# Patient Record
Sex: FEMALE | Race: WHITE | NOT HISPANIC OR LATINO | Employment: FULL TIME | URBAN - METROPOLITAN AREA
[De-identification: names, ages, dates, MRNs, and addresses within clinical notes are randomized per-mention and may not be internally consistent; named-entity substitution may affect disease eponyms.]

---

## 2018-06-19 ENCOUNTER — OFFICE VISIT (OUTPATIENT)
Dept: URGENT CARE | Facility: CLINIC | Age: 23
End: 2018-06-19
Payer: COMMERCIAL

## 2018-06-19 VITALS
SYSTOLIC BLOOD PRESSURE: 118 MMHG | OXYGEN SATURATION: 99 % | HEIGHT: 63 IN | HEART RATE: 108 BPM | WEIGHT: 116 LBS | DIASTOLIC BLOOD PRESSURE: 88 MMHG | RESPIRATION RATE: 14 BRPM | TEMPERATURE: 97.9 F | BODY MASS INDEX: 20.55 KG/M2

## 2018-06-19 DIAGNOSIS — H66.002 ACUTE SUPPURATIVE OTITIS MEDIA OF LEFT EAR WITHOUT SPONTANEOUS RUPTURE OF TYMPANIC MEMBRANE, RECURRENCE NOT SPECIFIED: Primary | ICD-10-CM

## 2018-06-19 DIAGNOSIS — J02.9 EXUDATIVE PHARYNGITIS: ICD-10-CM

## 2018-06-19 LAB
INT CON NEG: NORMAL
INT CON POS: NORMAL
S PYO AG THROAT QL: NEGATIVE

## 2018-06-19 PROCEDURE — 87880 STREP A ASSAY W/OPTIC: CPT | Performed by: PHYSICIAN ASSISTANT

## 2018-06-19 PROCEDURE — 99204 OFFICE O/P NEW MOD 45 MIN: CPT | Performed by: PHYSICIAN ASSISTANT

## 2018-06-19 RX ORDER — AMOXICILLIN 875 MG/1
875 TABLET, COATED ORAL 2 TIMES DAILY
Qty: 20 TAB | Refills: 0 | Status: SHIPPED | OUTPATIENT
Start: 2018-06-19 | End: 2018-06-29

## 2018-06-19 ASSESSMENT — PATIENT HEALTH QUESTIONNAIRE - PHQ9: CLINICAL INTERPRETATION OF PHQ2 SCORE: 0

## 2018-06-19 NOTE — PROGRESS NOTES
Subjective:   Pt is a 22 y.o. female who presents with Cough (loss of voice) and Pharyngitis            HPI  PT presents to  clinic today complaining of sore throat, pressure in ears, loss of voice, fatigue, runny nose. PT denies CP, SOB, NVD, abdominal pain, joint pain. PT states these symptoms began around 3 days ago. PT states the pain is a 7/10 with swallowing, aching in nature and worse at night.  Pt has not taken any RX medications for this condition. The pt's medication list, problem list, and allergies have been evaluated and reviewed during today's visit.    PMH:  Negative per pt.      PSH:  Negative per pt.      Fam Hx:  the patient's family history is not pertinent to their current complaint    Soc HX:  Social History     Social History   • Marital status: Single     Spouse name: N/A   • Number of children: N/A   • Years of education: N/A     Occupational History   • Not on file.     Social History Main Topics   • Smoking status: Never Smoker   • Smokeless tobacco: Never Used   • Alcohol use No   • Drug use: No   • Sexual activity: Not on file     Other Topics Concern   • Not on file     Social History Narrative   • No narrative on file         Medications:    Current Outpatient Prescriptions:   •  amoxicillin (AMOXIL) 875 MG tablet, Take 1 Tab by mouth 2 times a day for 10 days., Disp: 20 Tab, Rfl: 0  •  Alum & Mag Hydroxide-Simeth (MAALOX PLUS-BENADRYL-XYLOCAINE), Take 15 mL by mouth every 6 hours as needed for up to 5 days., Disp: 300 mL, Rfl: 0  •  omeprazole (PRILOSEC) 40 MG delayed-release capsule, Take 1 Cap by mouth every day., Disp: 30 Cap, Rfl: 0      Allergies:  Hydrocodone    ROS   Constitutional: Positive for malaise/fatigue.   HENT: Positive for congestion and sore throat. POS for LEFT ear pain.    Eyes: Negative for blurred vision, double vision and photophobia.   Respiratory: Negative for cough, shortness of breath and wheezing.    Cardiovascular: Negative for chest pain and  "palpitations.   Gastrointestinal: Negative for nausea, vomiting, abdominal pain, diarrhea and constipation.   Genitourinary: Negative for dysuria and flank pain.   Musculoskeletal: Negative for falls and myalgias.   Skin: Negative for itching and rash.   Neurological:  Negative for dizziness and tingling.   Endo/Heme/Allergies: Does not bruise/bleed easily.   Psychiatric/Behavioral: Negative for depression. The patient is not nervous/anxious.               Objective:     /88   Pulse (!) 108   Temp 36.6 °C (97.9 °F)   Resp 14   Ht 1.6 m (5' 3\")   Wt 52.6 kg (116 lb)   SpO2 99%   BMI 20.55 kg/m²      Physical Exam      Constitutional: PT is oriented to person, place, and time. PT appears well-developed and well-nourished. No distress.   HENT:   Head: Normocephalic and atraumatic.   Right Ear: Hearing, tympanic membrane, external ear and ear canal normal.   Left Ear: Hearing, external ear and ear canal normal. Tympanic membrane is erythematous and bulging. A middle ear effusion is present.   Nose: Mucosal edema, rhinorrhea and sinus tenderness present. Right sinus exhibits frontal sinus tenderness. Left sinus exhibits frontal sinus tenderness.   Mouth/Throat: Uvula is midline. Mucous membranes are pale. Posterior oropharyngeal edema and posterior oropharyngeal erythema with exudate on the left side  Eyes: Conjunctivae normal and EOM are normal. Pupils are equal, round, and reactive to light.   Neck: Normal range of motion. Neck supple. No thyromegaly present.   Cardiovascular: Normal rate, regular rhythm, normal heart sounds and intact distal pulses.  Exam reveals no gallop and no friction rub.    No murmur heard.  Pulmonary/Chest: Effort normal and breath sounds normal. No respiratory distress. PT has no wheezes. PT has no rales. PT exhibits no tenderness.   Abdominal: Soft. Bowel sounds are normal. PT exhibits no distension and no mass. There is no tenderness. There is no rebound and no guarding. "   Musculoskeletal: Normal range of motion. PT exhibits no edema and no tenderness.   Lymphadenopathy:     PT has no cervical adenopathy.   Neurological: PT is alert and oriented to person, place, and time. PT displays normal reflexes. No cranial nerve deficit. PT exhibits normal muscle tone. Coordination normal.   Skin: Skin is warm and dry. No rash noted. No erythema.   Psychiatric: PT has a normal mood and affect. PT behavior is normal. Judgment and thought content normal.          Assessment/Plan:     1. Acute suppurative otitis media of left ear without spontaneous rupture of tympanic membrane, recurrence not specified    - amoxicillin (AMOXIL) 875 MG tablet; Take 1 Tab by mouth 2 times a day for 10 days.  Dispense: 20 Tab; Refill: 0    2. Exudative pharyngitis  Back-up abx if symptoms do not improve in 2-3 days. PT on clinical presentation has exudate on oropharynx and it's possible beyond the strep A testing that this could be a different type of strep (C/G) or A. haemolyticum     - amoxicillin (AMOXIL) 875 MG tablet; Take 1 Tab by mouth 2 times a day for 10 days.  Dispense: 20 Tab; Refill: 0  - POCT Rapid Strep A-->NEG  - Alum & Mag Hydroxide-Simeth (MAALOX PLUS-BENADRYL-XYLOCAINE); Take 15 mL by mouth every 6 hours as needed for up to 5 days.  Dispense: 300 mL; Refill: 0    Rest, fluids encouraged.  OTC decongestant for congestion  Note given for work.  AVS with medical info given.  Pt was in full understanding and agreement with the plan.  Follow-up as needed if symptoms worsen or fail to improve.

## 2018-06-19 NOTE — LETTER
June 19, 2018       Patient: Hieu Stroud   YOB: 1995   Date of Visit: 6/19/2018         To Whom It May Concern:    It is my medical opinion that Hieu Stroud may be excused from work for the dates of 6/19/18-6/22/18.      If you have any questions or concerns, please don't hesitate to call [phone]          Sincerely,          Wilfredo Vegas P.A.-C.  Electronically Signed

## 2019-06-14 ENCOUNTER — OFFICE VISIT (OUTPATIENT)
Dept: URGENT CARE | Facility: CLINIC | Age: 24
End: 2019-06-14
Payer: COMMERCIAL

## 2019-06-14 VITALS
TEMPERATURE: 99.8 F | WEIGHT: 117 LBS | OXYGEN SATURATION: 99 % | DIASTOLIC BLOOD PRESSURE: 68 MMHG | BODY MASS INDEX: 20.73 KG/M2 | SYSTOLIC BLOOD PRESSURE: 114 MMHG | RESPIRATION RATE: 12 BRPM | HEART RATE: 85 BPM | HEIGHT: 63 IN

## 2019-06-14 DIAGNOSIS — J01.10 ACUTE NON-RECURRENT FRONTAL SINUSITIS: ICD-10-CM

## 2019-06-14 PROCEDURE — 99214 OFFICE O/P EST MOD 30 MIN: CPT | Performed by: NURSE PRACTITIONER

## 2019-06-14 RX ORDER — AMOXICILLIN AND CLAVULANATE POTASSIUM 875; 125 MG/1; MG/1
1 TABLET, FILM COATED ORAL 2 TIMES DAILY
Qty: 14 TAB | Refills: 0 | Status: SHIPPED | OUTPATIENT
Start: 2019-06-14 | End: 2019-06-21

## 2019-06-14 ASSESSMENT — ENCOUNTER SYMPTOMS
MUSCULOSKELETAL NEGATIVE: 1
SORE THROAT: 1
FEVER: 0
HEADACHES: 1
SPUTUM PRODUCTION: 0
COUGH: 1
SINUS PAIN: 1
BLURRED VISION: 0
DOUBLE VISION: 0
PALPITATIONS: 0
STRIDOR: 0
CONSTIPATION: 0
SINUS PRESSURE: 1
WHEEZING: 0
VOMITING: 0
CHILLS: 0
NAUSEA: 0
DIARRHEA: 0
ABDOMINAL PAIN: 0
DIZZINESS: 0
SHORTNESS OF BREATH: 0

## 2019-06-14 ASSESSMENT — PAIN SCALES - GENERAL: PAINLEVEL: 5=MODERATE PAIN

## 2019-06-14 NOTE — LETTER
June 14, 2019         Patient: Hieu Stroud   YOB: 1995   Date of Visit: 6/14/2019           To Whom it May Concern:    Hieu Stroud was seen in my clinic on 6/14/2019. Please excuse her from work 6/13/2019 through 6/14/2019. She may return on 6/15/2019.  If you have any questions or concerns, please don't hesitate to call.        Sincerely,           CRISTINA Ace.  Electronically Signed

## 2019-06-14 NOTE — PROGRESS NOTES
Subjective:   Hieu Stroud is a 23 y.o. female who presents for Congestion (x5 days); Sore Throat; Cough; and Sinus Problem        Sinusitis   This is a new problem. The current episode started in the past 7 days. The problem has been gradually worsening since onset. There has been no fever. The pain is moderate. Associated symptoms include congestion, coughing, headaches, sinus pressure and a sore throat. Pertinent negatives include no chills, ear pain or shortness of breath. Past treatments include oral decongestants. The treatment provided mild relief.        Review of Systems   Constitutional: Negative for chills and fever.   HENT: Positive for congestion, sinus pain, sinus pressure and sore throat. Negative for ear discharge and ear pain.    Eyes: Negative for blurred vision and double vision.   Respiratory: Positive for cough. Negative for sputum production, shortness of breath, wheezing and stridor.    Cardiovascular: Negative for chest pain and palpitations.   Gastrointestinal: Negative for abdominal pain, constipation, diarrhea, nausea and vomiting.   Musculoskeletal: Negative.    Skin: Negative.  Negative for itching and rash.   Neurological: Positive for headaches. Negative for dizziness.   All other systems reviewed and are negative.       PMH:  has no past medical history on file.  MEDS:   Current Outpatient Prescriptions:   •  norethindrone-ethinyl estradiol (CYCLAFEM 1/35) 1-35 MG-MCG per tablet, Take 1 Tab by mouth every day., Disp: , Rfl:   •  amoxicillin-clavulanate (AUGMENTIN) 875-125 MG Tab, Take 1 Tab by mouth 2 times a day for 7 days., Disp: 14 Tab, Rfl: 0  ALLERGIES:   Allergies   Allergen Reactions   • Hydrocodone Hives     SURGHX: History reviewed. No pertinent surgical history.  SOCHX:  reports that she has never smoked. She has never used smokeless tobacco. She reports that she does not drink alcohol or use drugs.  FH: Family history was reviewed, no pertinent findings to report      "Objective:   /68   Pulse 85   Temp 37.7 °C (99.8 °F) (Temporal)   Resp 12   Ht 1.6 m (5' 3\")   Wt 53.1 kg (117 lb)   LMP 05/24/2019 (Exact Date)   SpO2 99%   Breastfeeding? No   BMI 20.73 kg/m²   Physical Exam   Constitutional: She is oriented to person, place, and time. She appears well-developed and well-nourished. No distress.   HENT:   Head: Normocephalic.   Right Ear: Hearing and ear canal normal. Tympanic membrane is bulging. Tympanic membrane is not perforated and not erythematous. A middle ear effusion is present.   Left Ear: Hearing and ear canal normal. Tympanic membrane is injected and bulging. Tympanic membrane is not perforated and not erythematous. Tympanic membrane mobility is abnormal.  No middle ear effusion.   Nose: Mucosal edema present. No rhinorrhea. Right sinus exhibits frontal sinus tenderness. Right sinus exhibits no maxillary sinus tenderness. Left sinus exhibits frontal sinus tenderness. Left sinus exhibits no maxillary sinus tenderness.   Mouth/Throat: Uvula is midline, oropharynx is clear and moist and mucous membranes are normal. No posterior oropharyngeal edema or posterior oropharyngeal erythema.   Post nasal drip   Eyes: Pupils are equal, round, and reactive to light. Conjunctivae, EOM and lids are normal.   Neck: Normal range of motion. No thyromegaly present.   Cardiovascular: Normal rate, regular rhythm and normal heart sounds.    Pulmonary/Chest: Effort normal and breath sounds normal. No accessory muscle usage. No apnea, no tachypnea and no bradypnea. No respiratory distress. She has no decreased breath sounds. She has no wheezes.   Lymphadenopathy:        Head (right side): Submandibular adenopathy present. No tonsillar adenopathy present.        Head (left side): No submandibular and no tonsillar adenopathy present.   Neurological: She is alert and oriented to person, place, and time.   Skin: Skin is warm and dry. No rash noted. She is not diaphoretic. "   Psychiatric: She has a normal mood and affect. Her speech is normal and behavior is normal. Judgment and thought content normal.   Vitals reviewed.        Assessment/Plan:   Assessment    1. Acute non-recurrent frontal sinusitis  - amoxicillin-clavulanate (AUGMENTIN) 875-125 MG Tab; Take 1 Tab by mouth 2 times a day for 7 days.  Dispense: 14 Tab; Refill: 0    Other orders  - norethindrone-ethinyl estradiol (CYCLAFEM 1/35) 1-35 MG-MCG per tablet; Take 1 Tab by mouth every day.    Use over-the-counter Flonase daily, one spray each nostril in the morning.  Recommended OTC Ibuprofen for sore throat relief - take as directed on package    Differential diagnosis, natural history, supportive care, and indications for immediate follow-up discussed.

## 2019-09-12 ENCOUNTER — OFFICE VISIT (OUTPATIENT)
Dept: URGENT CARE | Facility: CLINIC | Age: 24
End: 2019-09-12
Payer: COMMERCIAL

## 2019-09-12 VITALS
HEIGHT: 63 IN | DIASTOLIC BLOOD PRESSURE: 74 MMHG | SYSTOLIC BLOOD PRESSURE: 110 MMHG | HEART RATE: 86 BPM | WEIGHT: 120 LBS | OXYGEN SATURATION: 97 % | TEMPERATURE: 98.6 F | BODY MASS INDEX: 21.26 KG/M2 | RESPIRATION RATE: 16 BRPM

## 2019-09-12 DIAGNOSIS — J03.90 TONSILLITIS: ICD-10-CM

## 2019-09-12 DIAGNOSIS — J02.9 SORE THROAT: ICD-10-CM

## 2019-09-12 LAB
INT CON NEG: NORMAL
INT CON POS: NORMAL
S PYO AG THROAT QL: NORMAL

## 2019-09-12 PROCEDURE — 87880 STREP A ASSAY W/OPTIC: CPT | Performed by: NURSE PRACTITIONER

## 2019-09-12 PROCEDURE — 99214 OFFICE O/P EST MOD 30 MIN: CPT | Performed by: NURSE PRACTITIONER

## 2019-09-12 RX ORDER — AMOXICILLIN 875 MG/1
875 TABLET, COATED ORAL 2 TIMES DAILY
Qty: 14 TAB | Refills: 0 | Status: SHIPPED | OUTPATIENT
Start: 2019-09-12 | End: 2019-09-13

## 2019-09-12 ASSESSMENT — ENCOUNTER SYMPTOMS
STRIDOR: 0
MUSCULOSKELETAL NEGATIVE: 1
BLURRED VISION: 0
SPUTUM PRODUCTION: 0
PALPITATIONS: 0
TROUBLE SWALLOWING: 1
ABDOMINAL PAIN: 0
SORE THROAT: 1
HEADACHES: 0
DIZZINESS: 0
FEVER: 0
CHILLS: 0
COUGH: 1
DIARRHEA: 0
CONSTIPATION: 0
NAUSEA: 0
VOMITING: 0
SWOLLEN GLANDS: 1
WHEEZING: 0
DOUBLE VISION: 0

## 2019-09-12 ASSESSMENT — PATIENT HEALTH QUESTIONNAIRE - PHQ9: CLINICAL INTERPRETATION OF PHQ2 SCORE: 0

## 2019-09-12 NOTE — LETTER
September 12, 2019         Patient: Hieu Stroud   YOB: 1995   Date of Visit: 9/12/2019           To Whom it May Concern:    Hieu Stroud was seen in my clinic on 9/12/2019. Please excuse her from work 9/12/2019. She may return on 9/13/2019.    If you have any questions or concerns, please don't hesitate to call.        Sincerely,           CRISTINA Ace.  Electronically Signed

## 2019-09-12 NOTE — PROGRESS NOTES
"Subjective:   Hieu Stroud is a 24 y.o. female who presents for Pharyngitis (hurts as patient talks, painful as eating or drinking liquidsx 1 day)        Pharyngitis    This is a new problem. The current episode started yesterday. The problem has been waxing and waning. Neither side of throat is experiencing more pain than the other. The pain is moderate. Associated symptoms include congestion, coughing, swollen glands and trouble swallowing. Pertinent negatives include no abdominal pain, diarrhea, ear discharge, ear pain, headaches, stridor or vomiting. She has had exposure to strep. She has tried NSAIDs for the symptoms. The treatment provided mild relief.        Review of Systems   Constitutional: Negative for chills and fever.   HENT: Positive for congestion, sore throat and trouble swallowing. Negative for ear discharge and ear pain.    Eyes: Negative for blurred vision and double vision.   Respiratory: Positive for cough. Negative for sputum production, wheezing and stridor.    Cardiovascular: Negative for chest pain and palpitations.   Gastrointestinal: Negative for abdominal pain, constipation, diarrhea, nausea and vomiting.   Musculoskeletal: Negative.    Skin: Negative.  Negative for itching and rash.   Neurological: Negative for dizziness and headaches.   All other systems reviewed and are negative.    Patient's PMH, SocHx, SurgHx, FamHx, Drug allergies and medications reviewed.     Objective:   /74 (BP Location: Left arm, Patient Position: Sitting, BP Cuff Size: Adult)   Pulse 86   Temp 37 °C (98.6 °F) (Temporal)   Resp 16   Ht 1.6 m (5' 3\")   Wt 54.4 kg (120 lb)   SpO2 97%   BMI 21.26 kg/m²   Physical Exam   Constitutional: She is oriented to person, place, and time. She appears well-developed and well-nourished. No distress.   HENT:   Head: Normocephalic.   Right Ear: Hearing, tympanic membrane and ear canal normal. Tympanic membrane is not erythematous. No middle ear effusion.   Left " Ear: Hearing, tympanic membrane and ear canal normal. Tympanic membrane is not erythematous.  No middle ear effusion.   Nose: Nose normal. No rhinorrhea. Right sinus exhibits no maxillary sinus tenderness and no frontal sinus tenderness. Left sinus exhibits no maxillary sinus tenderness and no frontal sinus tenderness.   Mouth/Throat: Uvula is midline and mucous membranes are normal. Posterior oropharyngeal erythema present. Tonsils are 2+ on the right. Tonsils are 1+ on the left. No tonsillar exudate.   Eyes: Pupils are equal, round, and reactive to light. Conjunctivae, EOM and lids are normal.   Neck: Normal range of motion. No thyromegaly present.   Cardiovascular: Normal rate, regular rhythm and normal heart sounds.   Pulmonary/Chest: Effort normal and breath sounds normal. No respiratory distress. She has no wheezes.   Lymphadenopathy:        Head (right side): Tonsillar adenopathy present. No submandibular adenopathy present.        Head (left side): No submandibular and no tonsillar adenopathy present.   Neurological: She is alert and oriented to person, place, and time.   Skin: Skin is warm and dry. No rash noted. She is not diaphoretic.   Psychiatric: She has a normal mood and affect. Her speech is normal and behavior is normal. Judgment and thought content normal.   Vitals reviewed.        Assessment/Plan:   Assessment    1. Sore throat  - POCT Rapid Strep A  - amoxicillin (AMOXIL) 875 MG tablet; Take 1 Tab by mouth 2 times a day for 7 days.  Dispense: 14 Tab; Refill: 0    2. Tonsillitis  - amoxicillin (AMOXIL) 875 MG tablet; Take 1 Tab by mouth 2 times a day for 7 days.  Dispense: 14 Tab; Refill: 0    Rapid strep negative  Due to patient's exposure and physical exam, will treat with antibiotic at this time.    Differential diagnosis, natural history, supportive care, and indications for immediate follow-up discussed.     **Please note that all invasive procedures during this visit were performed by myself  and/or the Medical Assistant under the supervision of the PA or MD in office**

## 2019-09-13 ENCOUNTER — OFFICE VISIT (OUTPATIENT)
Dept: URGENT CARE | Facility: MEDICAL CENTER | Age: 24
End: 2019-09-13
Payer: COMMERCIAL

## 2019-09-13 VITALS
OXYGEN SATURATION: 99 % | SYSTOLIC BLOOD PRESSURE: 90 MMHG | HEART RATE: 62 BPM | WEIGHT: 120 LBS | DIASTOLIC BLOOD PRESSURE: 60 MMHG | TEMPERATURE: 98.7 F | BODY MASS INDEX: 21.26 KG/M2

## 2019-09-13 DIAGNOSIS — J02.9 SORE THROAT: ICD-10-CM

## 2019-09-13 LAB
HETEROPH AB SER QL LA: NEGATIVE
INT CON NEG: NORMAL
INT CON POS: NORMAL

## 2019-09-13 PROCEDURE — 99213 OFFICE O/P EST LOW 20 MIN: CPT | Performed by: NURSE PRACTITIONER

## 2019-09-13 PROCEDURE — 86308 HETEROPHILE ANTIBODY SCREEN: CPT | Performed by: NURSE PRACTITIONER

## 2019-09-13 ASSESSMENT — ENCOUNTER SYMPTOMS
VOMITING: 0
HEADACHES: 0
WHEEZING: 0
SORE THROAT: 1
STRIDOR: 0
CONSTIPATION: 0
ABDOMINAL PAIN: 0
COUGH: 0
FEVER: 0
MUSCULOSKELETAL NEGATIVE: 1
CHILLS: 0
SWOLLEN GLANDS: 1
DOUBLE VISION: 0
TROUBLE SWALLOWING: 1
NAUSEA: 0
DIARRHEA: 0
DIZZINESS: 0
BLURRED VISION: 0
PALPITATIONS: 0

## 2019-09-13 NOTE — PROGRESS NOTES
Subjective:   Hieu Stroud is a 24 y.o. female who presents for Sore Throat (exposed to mono )        Pharyngitis    This is a new problem. The current episode started in the past 7 days. The problem has been waxing and waning. Neither side of throat is experiencing more pain than the other. There has been no fever. The pain is moderate. Associated symptoms include swollen glands and trouble swallowing. Pertinent negatives include no abdominal pain, congestion, coughing, diarrhea, ear discharge, ear pain, headaches, stridor or vomiting. She has had exposure to mono. Exposure to: States boyfriend was recently diagnosed with mono and would like to be tested. She has tried NSAIDs for the symptoms. The treatment provided mild relief.        Review of Systems   Constitutional: Negative for chills and fever.   HENT: Positive for sore throat and trouble swallowing. Negative for congestion, ear discharge and ear pain.    Eyes: Negative for blurred vision and double vision.   Respiratory: Negative for cough, wheezing and stridor.    Cardiovascular: Negative for chest pain and palpitations.   Gastrointestinal: Negative for abdominal pain, constipation, diarrhea, nausea and vomiting.   Musculoskeletal: Negative.    Skin: Negative.  Negative for itching and rash.   Neurological: Negative for dizziness and headaches.   All other systems reviewed and are negative.    Patient's PMH, SocHx, SurgHx, FamHx, Drug allergies and medications reviewed.     Objective:   BP (!) 90/60   Pulse 62   Temp 37.1 °C (98.7 °F)   Wt 54.4 kg (120 lb)   SpO2 99%   BMI 21.26 kg/m²   Physical Exam   Constitutional: She is oriented to person, place, and time. She appears well-developed and well-nourished. No distress.   HENT:   Head: Normocephalic.   Right Ear: Hearing, tympanic membrane and ear canal normal. Tympanic membrane is not erythematous. No middle ear effusion.   Left Ear: Hearing, tympanic membrane and ear canal normal. Tympanic  membrane is not erythematous.  No middle ear effusion.   Nose: No rhinorrhea. Right sinus exhibits no maxillary sinus tenderness and no frontal sinus tenderness. Left sinus exhibits no maxillary sinus tenderness and no frontal sinus tenderness.   Mouth/Throat: Uvula is midline and mucous membranes are normal. Posterior oropharyngeal erythema present. Tonsils are 2+ on the right. Tonsils are 2+ on the left. No tonsillar exudate.   Eyes: Pupils are equal, round, and reactive to light. Conjunctivae, EOM and lids are normal.   Neck: Normal range of motion. No thyromegaly present.   Cardiovascular: Normal rate, regular rhythm and normal heart sounds.   Pulmonary/Chest: Effort normal and breath sounds normal. No respiratory distress. She has no wheezes.   Lymphadenopathy:        Head (right side): Tonsillar adenopathy present. No submandibular adenopathy present.        Head (left side): No submandibular and no tonsillar adenopathy present.   Neurological: She is alert and oriented to person, place, and time.   Skin: Skin is warm and dry. No rash noted. She is not diaphoretic.   Psychiatric: She has a normal mood and affect. Her speech is normal and behavior is normal. Judgment and thought content normal.   Vitals reviewed.        Assessment/Plan:   Assessment    1. Sore throat  - POCT Mononucleosis (mono)    Patient declines strep swab at this time.  We discussed that mono is a viral illness where no antibiotics are indicated and can take up to several weeks to seroconvert, where she could potentially test positive.    Differential diagnosis, natural history, supportive care, and indications for immediate follow-up discussed.     **Please note that all invasive procedures during this visit were performed by myself and/or the Medical Assistant under the supervision of the PA or MD in office**

## 2019-09-23 ENCOUNTER — OFFICE VISIT (OUTPATIENT)
Dept: URGENT CARE | Facility: PHYSICIAN GROUP | Age: 24
End: 2019-09-23
Payer: COMMERCIAL

## 2019-09-23 VITALS
RESPIRATION RATE: 16 BRPM | DIASTOLIC BLOOD PRESSURE: 62 MMHG | OXYGEN SATURATION: 97 % | TEMPERATURE: 96.9 F | HEART RATE: 68 BPM | HEIGHT: 63 IN | BODY MASS INDEX: 21.26 KG/M2 | WEIGHT: 120 LBS | SYSTOLIC BLOOD PRESSURE: 110 MMHG

## 2019-09-23 DIAGNOSIS — J02.9 VIRAL PHARYNGITIS: ICD-10-CM

## 2019-09-23 LAB
FLUAV+FLUBV AG SPEC QL IA: NORMAL
HETEROPH AB SER QL LA: NORMAL
INT CON NEG: NORMAL
INT CON POS: NORMAL
S PYO AG THROAT QL: NORMAL

## 2019-09-23 PROCEDURE — 87804 INFLUENZA ASSAY W/OPTIC: CPT | Performed by: FAMILY MEDICINE

## 2019-09-23 PROCEDURE — 99214 OFFICE O/P EST MOD 30 MIN: CPT | Performed by: FAMILY MEDICINE

## 2019-09-23 PROCEDURE — 86308 HETEROPHILE ANTIBODY SCREEN: CPT | Performed by: FAMILY MEDICINE

## 2019-09-23 PROCEDURE — 87880 STREP A ASSAY W/OPTIC: CPT | Performed by: FAMILY MEDICINE

## 2019-09-23 NOTE — PROGRESS NOTES
"Subjective:     CC:  presents with Pharyngitis            Pharyngitis   This is a new problem. The current episode started in the past 3 days. The problem has been unchanged. There has been no fever. The pain is moderate. Associated symptoms includes: ear congestion, nasal congestion, dry cough.         Pertinent negatives include no abdominal pain, diarrhea, headaches, shortness of breath or vomiting. no exposure to strep .   + exposure to mono.   has tried acetaminophen for the symptoms. The treatment provided mild relief.     .pmsf      Review of Systems   Constitutional: Negative for fever, chills and malaise/fatigue.   Eyes: Negative for vision changes, d/c.    Respiratory: + for cough .   Denies sputum production.    Cardiovascular: Negative for chest pain and palpitations.   Gastrointestinal: Negative for nausea, vomiting, abdominal pain, diarrhea and constipation.   Genitourinary: Negative for dysuria, urgency and frequency.   Skin: Negative for rash or  itching.   Neurological: Negative for dizziness and tingling.    Psychiatric/Behavioral: Negative for depression.   Hematologic/lymphatic - denies bruising or excessive bleeding  All other systems reviewed and are negative.            Objective:   /62   Pulse 68   Temp 36.1 °C (96.9 °F) (Temporal)   Resp 16   Ht 1.6 m (5' 3\")   Wt 54.4 kg (120 lb)   SpO2 97%         Physical Exam   Constitutional:   oriented to person, place, and time.  appears well-developed and well-nourished. No distress.   HENT:   Head: Normocephalic and atraumatic.   Right Ear: External ear normal.   Left Ear: External ear normal.   Nose: Mucosal edema present. Right sinus exhibits no maxillary sinus tenderness and no frontal sinus tenderness. Left sinus exhibits no maxillary sinus tenderness and no frontal sinus tenderness.   Mouth/Throat: no posterior oropharyngeal exudate.   There is posterior oropharyngeal erythema present. No posterior oropharyngeal edema.   Tonsils 2+ " bilaterally     Eyes: Conjunctivae and EOM are normal. Pupils are equal, round, and reactive to light. Right eye exhibits no discharge. Left eye exhibits no discharge. No scleral icterus.   Neck: Normal range of motion. Neck supple. No JVD present. No tracheal deviation present. No thyromegaly present.   Cardiovascular: Normal rate, regular rhythm, normal heart sounds and intact distal pulses.  Exam reveals no friction rub.    No murmur heard.  Pulmonary/Chest: Effort normal and breath sounds normal. No respiratory distress.   no wheezes.   no rales.    Musculoskeletal:  exhibits no edema.   Lymphadenopathy:   No cervical LAD  Neurological:   alert and oriented to person, place, and time.   Skin: Skin is warm and dry. No erythema.   Psychiatric:   normal mood and affect.   Nursing note and vitals reviewed.             Assessment/Plan:     1. Viral pharyngitis  Rapid strep, flu, mono all negative       - Alum & Mag Hydroxide-Simeth (MBX) Suspension; Take 5 mL by mouth every 6 hours as needed.  Dispense: 150 mL; Refill: 0        Follow up in one week if no improvement, sooner if symptoms worsen.

## 2019-09-23 NOTE — LETTER
September 23, 2019         Patient: Hieu Stroud   YOB: 1995   Date of Visit: 9/23/2019           To Whom it May Concern:    Hieu Stroud was seen in my clinic on 9/23/2019.      If you have any questions or concerns, please don't hesitate to call.        Sincerely,           Brian Chauhan M.D.  Electronically Signed

## 2019-12-20 ENCOUNTER — APPOINTMENT (OUTPATIENT)
Dept: RADIOLOGY | Facility: MEDICAL CENTER | Age: 24
End: 2019-12-20
Attending: PHYSICIAN ASSISTANT
Payer: COMMERCIAL

## 2019-12-20 DIAGNOSIS — S72.002S CLOSED FRACTURE OF LEFT HIP, SEQUELA: ICD-10-CM

## 2019-12-20 DIAGNOSIS — S70.02XS HEMATOMA OF LEFT HIP, SEQUELA: ICD-10-CM

## 2019-12-20 PROCEDURE — 73721 MRI JNT OF LWR EXTRE W/O DYE: CPT | Mod: LT

## 2021-10-19 PROBLEM — S66.811A EXTENSOR TENDON RUPTURE OF HAND, RIGHT, INITIAL ENCOUNTER: Status: ACTIVE | Noted: 2021-10-19

## 2021-11-15 ENCOUNTER — HOSPITAL ENCOUNTER (OUTPATIENT)
Facility: MEDICAL CENTER | Age: 26
End: 2021-11-15
Attending: ANESTHESIOLOGY
Payer: COMMERCIAL

## 2021-11-15 LAB
COVID ORDER STATUS COVID19: NORMAL
SARS-COV-2 RNA RESP QL NAA+PROBE: NOTDETECTED
SPECIMEN SOURCE: NORMAL

## 2021-11-15 PROCEDURE — U0003 INFECTIOUS AGENT DETECTION BY NUCLEIC ACID (DNA OR RNA); SEVERE ACUTE RESPIRATORY SYNDROME CORONAVIRUS 2 (SARS-COV-2) (CORONAVIRUS DISEASE [COVID-19]), AMPLIFIED PROBE TECHNIQUE, MAKING USE OF HIGH THROUGHPUT TECHNOLOGIES AS DESCRIBED BY CMS-2020-01-R: HCPCS

## 2021-11-15 PROCEDURE — U0005 INFEC AGEN DETEC AMPLI PROBE: HCPCS

## 2022-01-03 ENCOUNTER — HOSPITAL ENCOUNTER (OUTPATIENT)
Facility: MEDICAL CENTER | Age: 27
End: 2022-01-03
Attending: FAMILY MEDICINE
Payer: COMMERCIAL

## 2022-01-03 ENCOUNTER — OFFICE VISIT (OUTPATIENT)
Dept: URGENT CARE | Facility: PHYSICIAN GROUP | Age: 27
End: 2022-01-03
Payer: COMMERCIAL

## 2022-01-03 VITALS
DIASTOLIC BLOOD PRESSURE: 74 MMHG | BODY MASS INDEX: 21.35 KG/M2 | HEART RATE: 92 BPM | HEIGHT: 62 IN | RESPIRATION RATE: 16 BRPM | TEMPERATURE: 97.9 F | OXYGEN SATURATION: 98 % | WEIGHT: 116 LBS | SYSTOLIC BLOOD PRESSURE: 104 MMHG

## 2022-01-03 DIAGNOSIS — Z03.818 ENCOUNTER FOR PATIENT CONCERN ABOUT EXPOSURE TO INFECTIOUS ORGANISM: ICD-10-CM

## 2022-01-03 LAB
EXTERNAL QUALITY CONTROL: NORMAL
INT CON NEG: NEGATIVE
INT CON POS: POSITIVE
S PYO AG THROAT QL: NEGATIVE
SARS-COV+SARS-COV-2 AG RESP QL IA.RAPID: NEGATIVE

## 2022-01-03 PROCEDURE — 87880 STREP A ASSAY W/OPTIC: CPT | Performed by: FAMILY MEDICINE

## 2022-01-03 PROCEDURE — 87426 SARSCOV CORONAVIRUS AG IA: CPT | Performed by: FAMILY MEDICINE

## 2022-01-03 PROCEDURE — U0003 INFECTIOUS AGENT DETECTION BY NUCLEIC ACID (DNA OR RNA); SEVERE ACUTE RESPIRATORY SYNDROME CORONAVIRUS 2 (SARS-COV-2) (CORONAVIRUS DISEASE [COVID-19]), AMPLIFIED PROBE TECHNIQUE, MAKING USE OF HIGH THROUGHPUT TECHNOLOGIES AS DESCRIBED BY CMS-2020-01-R: HCPCS

## 2022-01-03 PROCEDURE — 99213 OFFICE O/P EST LOW 20 MIN: CPT | Mod: CS | Performed by: FAMILY MEDICINE

## 2022-01-04 NOTE — PROGRESS NOTES
"  Subjective:      26 y.o. female presents to urgent care for cold symptoms that started yesterday.  She is experiencing sore throat, fatigue, headache, diarrhea, and loss of sense of smell/taste. No body aches, vomiting, or fevers. She has been using Theraflu and Ricola throat lozenges - all of which is only providing moderate relief. She denies any tobacco product use.  No history of asthma or COPD.  She is fully vaccinated against COVID.  She has had no known sick contacts.    She denies any other questions or concerns at this time.    Current problem list, medication, and past medical/surgical history were reviewed in Epic.    ROS  See HPI     Objective:      /74   Pulse 92   Temp 36.6 °C (97.9 °F) (Temporal)   Resp 16   Ht 1.575 m (5' 2\")   Wt 52.6 kg (116 lb)   SpO2 98%   BMI 21.22 kg/m²     Physical Exam  Constitutional:       General: She is not in acute distress.     Appearance: She is not diaphoretic.   HENT:      Mouth/Throat:      Palate: No lesions.      Pharynx: Uvula midline. Posterior oropharyngeal erythema present.      Tonsils: No tonsillar exudate. 2+ on the right. 2+ on the left.   Cardiovascular:      Rate and Rhythm: Normal rate and regular rhythm.      Heart sounds: Normal heart sounds.   Pulmonary:      Effort: Pulmonary effort is normal. No respiratory distress.      Breath sounds: Normal breath sounds.   Neurological:      Mental Status: She is alert.   Psychiatric:         Mood and Affect: Affect normal.         Judgment: Judgment normal.       Assessment/Plan:     1. Encounter for patient concern about exposure to infectious organism  Rapid strep negative.  Rapid Covid negative.  PCR Covid has been sent. In the meantime patient was advised to isolate until COVID test results returned. I encouraged mask use, frequent handwashing, wiping down hard surfaces, etc. Tylenol and Ibuprofen were recommended for symptomatic relief. If positive they will be contacted by their local " health department regarding return to work/school protocols. Patient is currently without indication of need for higher level of care. Patient/Guardian was given precautionary signs/symptoms that mandate immediate follow up and evaluation in the ED. The patient and/or guardian demonstrated a good understanding and agreed with the treatment plan.  - POCT SARS-COV Antigen NOMI (Symptomatic Only)  - POCT Rapid Strep A  - SARS-CoV-2 PCR (24 hour In-House): Collect NP swab in VTM; Future      Instructed to return to Urgent Care or nearest Emergency Department if symptoms fail to improve, for any change in condition, further concerns, or new concerning symptoms. Patient states understanding of the plan of care and discharge instructions.    Lala Aguirre M.D.

## 2022-01-05 DIAGNOSIS — Z03.818 ENCOUNTER FOR PATIENT CONCERN ABOUT EXPOSURE TO INFECTIOUS ORGANISM: ICD-10-CM

## 2022-01-06 LAB
COVID ORDER STATUS COVID19: NORMAL
SARS-COV-2 RNA RESP QL NAA+PROBE: DETECTED
SPECIMEN SOURCE: ABNORMAL

## 2022-05-07 ENCOUNTER — HOSPITAL ENCOUNTER (OUTPATIENT)
Dept: RADIOLOGY | Facility: MEDICAL CENTER | Age: 27
End: 2022-05-07
Attending: FAMILY MEDICINE
Payer: COMMERCIAL

## 2022-05-07 DIAGNOSIS — E04.1 THYROID NODULE: ICD-10-CM

## 2022-05-07 PROCEDURE — 76536 US EXAM OF HEAD AND NECK: CPT

## 2023-03-17 ENCOUNTER — HOSPITAL ENCOUNTER (OUTPATIENT)
Facility: MEDICAL CENTER | Age: 28
End: 2023-03-17
Attending: OBSTETRICS & GYNECOLOGY | Admitting: OBSTETRICS & GYNECOLOGY
Payer: COMMERCIAL

## 2023-03-17 DIAGNOSIS — Z01.812 PRE-OPERATIVE LABORATORY EXAMINATION: ICD-10-CM

## 2023-03-30 ENCOUNTER — APPOINTMENT (OUTPATIENT)
Dept: ADMISSIONS | Facility: MEDICAL CENTER | Age: 28
End: 2023-03-30
Attending: ANESTHESIOLOGY
Payer: COMMERCIAL

## 2023-04-04 ENCOUNTER — PRE-ADMISSION TESTING (OUTPATIENT)
Dept: ADMISSIONS | Facility: MEDICAL CENTER | Age: 28
End: 2023-04-04
Attending: ANESTHESIOLOGY
Payer: COMMERCIAL

## 2023-04-10 ENCOUNTER — APPOINTMENT (OUTPATIENT)
Dept: ADMISSIONS | Facility: MEDICAL CENTER | Age: 28
End: 2023-04-10
Attending: OBSTETRICS & GYNECOLOGY
Payer: COMMERCIAL

## 2023-04-10 DIAGNOSIS — Z01.812 PRE-OPERATIVE LABORATORY EXAMINATION: ICD-10-CM

## 2023-09-10 ENCOUNTER — HOSPITAL ENCOUNTER (EMERGENCY)
Facility: MEDICAL CENTER | Age: 28
End: 2023-09-10
Attending: EMERGENCY MEDICINE
Payer: COMMERCIAL

## 2023-09-10 VITALS
WEIGHT: 114.42 LBS | HEIGHT: 63 IN | TEMPERATURE: 98.4 F | SYSTOLIC BLOOD PRESSURE: 114 MMHG | RESPIRATION RATE: 18 BRPM | OXYGEN SATURATION: 96 % | DIASTOLIC BLOOD PRESSURE: 77 MMHG | HEART RATE: 73 BPM | BODY MASS INDEX: 20.27 KG/M2

## 2023-09-10 DIAGNOSIS — Z77.098 CHEMICAL EXPOSURE: ICD-10-CM

## 2023-09-10 DIAGNOSIS — T59.91XA TOXIC INHALATION INJURY, ACCIDENTAL OR UNINTENTIONAL, INITIAL ENCOUNTER: ICD-10-CM

## 2023-09-10 PROCEDURE — 700102 HCHG RX REV CODE 250 W/ 637 OVERRIDE(OP): Performed by: EMERGENCY MEDICINE

## 2023-09-10 PROCEDURE — 99282 EMERGENCY DEPT VISIT SF MDM: CPT

## 2023-09-10 RX ADMIN — LIDOCAINE HYDROCHLORIDE 15 ML: 20 SOLUTION ORAL; TOPICAL at 14:43

## 2023-09-10 NOTE — DISCHARGE INSTRUCTIONS
You do have a toxic inhalation injury resulting in a mild irritant to your oropharynx, mouth, throat.  Here we have watched you and deemed it safe for you to go home as you do not have evidence of pulmonary edema, bronchospasm, difficulty breathing, severe injury.  I do recommend Benadryl Maalox solution to help with irritation of the throat or a throat lozenge or to help with the discomfort.  Return to emergency room if difficulty swallowing, profound shortness of breath.

## 2023-09-10 NOTE — ED TRIAGE NOTES
"Chief Complaint   Patient presents with    Chemical Exposure     Pt was cleaning her blinds with an oven  \"Easy Off\" when a simeon of wind blew some into her face and mouth, pt now has irritation in her mouth and throat but denies a feeling of swelling, denies difficulty breathing, pt is speaking in full sentences but voice sounds 'scratchy'. Exposure happened around noon today.      Pt ambulatory to triage for above complaints, VSS on RA, GCS 15, NAD.    Pt states symptoms progressed up to 10 minutes ago but have remained the same since then without progression.    Pt returned to [facility]. Educated on triage process and to inform staff of any changes.     /78   Pulse 76   Temp 37 °C (98.6 °F) (Temporal)   Resp 18   Ht 1.6 m (5' 3\")   Wt 51.9 kg (114 lb 6.7 oz)   SpO2 100%   BMI 20.27 kg/m²     "

## 2023-09-10 NOTE — ED NOTES
Poison Control Center   Case number: [id number]  Per poison control, this is a mild exposure.  They recommend PO challenge and to avoid hot spicy/acidic things until her throat goes back to normal.  Look for coughing/bronchospasms

## 2023-09-11 NOTE — ED PROVIDER NOTES
"ED Provider Note    CHIEF COMPLAINT  Chief Complaint   Patient presents with    Chemical Exposure     Pt was cleaning her blinds with an oven  \"Easy Off\" when a simeon of wind blew some into her face and mouth, pt now has irritation in her mouth and throat but denies a feeling of swelling, denies difficulty breathing, pt is speaking in full sentences but voice sounds 'scratchy'. Exposure happened around noon today.        EXTERNAL RECORDS REVIEWED  Physician assistant encounter on 7/27/2022 for right knee pain and the patient will be following up with physical therapy and we wearing a knee brace.  HPI/ROS  LIMITATION TO HISTORY     Hieu Stroud is a 28 y.o. female who presents with complaint of chemical's Crow.  She was using easy off on the blinds outside and when she sprayed the EZ off a wind simeon came and went into her mouth.  She denies any ocular involvement.  She states she swallowed it and inhaled it.  She spit it out and rinse her mouth as soon as she could.  Since then she has had a slight pain in her esophagus and the back of her throat.  She denies difficulty breathing, fever, shakes, chills, sweats, nausea, vomiting, visual abnormality or eye pain.    PAST MEDICAL HISTORY   has a past medical history of Pneumonia.    SURGICAL HISTORY   has a past surgical history that includes extensor tendon repair (Right, 11/19/2021) and other abdominal surgery.    FAMILY HISTORY  No family history on file.    SOCIAL HISTORY  Social History     Tobacco Use    Smoking status: Never    Smokeless tobacco: Never   Vaping Use    Vaping Use: Never used   Substance and Sexual Activity    Alcohol use: Yes     Comment: socially, occasional    Drug use: No    Sexual activity: Yes     Partners: Male     Birth control/protection: Pill       CURRENT MEDICATIONS  Home Medications       Reviewed by Jerod Green R.N. (Registered Nurse) on 09/10/23 at 1247  Med List Status: Not Addressed     Medication Last Dose " "Status   multivitamin Tab  Active                    ALLERGIES  Allergies   Allergen Reactions    Hydrocodone Hives    Kiwi Extract      Tongue itch       PHYSICAL EXAM  VITAL SIGNS: /77   Pulse 73   Temp 36.9 °C (98.4 °F)   Resp 18   Ht 1.6 m (5' 3\")   Wt 51.9 kg (114 lb 6.7 oz)   SpO2 96%   BMI 20.27 kg/m²      Constitutional: Well developed, Well nourished, No acute distress, Non-toxic appearance.   HENT: Normocephalic, Atraumatic, slight pharyngeal erythema, no evidence of ulceration, bleeding, mass, excessive salivation slight scratchy voice  Eyes: PERRLA, EOMI, Conjunctiva normal, No discharge.   Neck: Normal range of motion, No cervical spine tenderness, Supple, No meningeus, No stridor.  Lungs:  No respiratory distress, No rales, No rhonchi, No wheezing, No chest tenderness.   Abdomen: Bowel sounds normal, Soft, No tenderness, No guarding, No rebound, No  Skin: Warm, Dry, No erythema, No rash.       COURSE & MEDICAL DECISION MAKING    ED Observation Status? No; Patient does not meet criteria for ED Observation.     INITIAL ASSESSMENT, COURSE AND PLAN  Care Narrative: This is a pleasant 28-year-old female presents with easy off exposure.  We did call poison control and they do believe the patient is fine.  There is an irritant to the skin and might cause bronchospasms initially but she has had the contact several hours before.  We observed her for approximate hour and she had no symptoms aside slight scratchy throat and irritation oropharynx and esophagus.  She received Magic mouthwash.  She was positive p.o. difficulty.  Had a long discussion with the patient concerning need to return to the emergency department if she had increasing symptoms such as shortness of breath, difficulty swallowing or severe pain.    Reevaluation prior to discharge, the patient was positive p.o., she had no evidence of stridor, no Inse shortness of breath, no difficulty breathing.          Decision tools and " prescription drugs considered including, but not limited to:  I did consider x-ray but the patient was not hypoxic, she had no respiratory complaint therefore do not believe she requires x-ray for possible pneumonitis or toxicity. .    FINAL DIAGNOSIS  1. Chemical exposure    2. Toxic inhalation injury, accidental or unintentional, initial encounter         DISPOSITION:  Patient will be discharged home in stable condition.    FOLLOW UP:  [facility], Emergency Dept  [facility] [facility] [address] [phone]    If symptoms worsen      Electronically signed by: Gaetano Muro D.O., 9/10/2023 8:24 PM

## 2024-01-23 ENCOUNTER — APPOINTMENT (OUTPATIENT)
Dept: RADIOLOGY | Facility: MEDICAL CENTER | Age: 29
End: 2024-01-23
Attending: EMERGENCY MEDICINE
Payer: COMMERCIAL

## 2024-01-23 ENCOUNTER — HOSPITAL ENCOUNTER (EMERGENCY)
Facility: MEDICAL CENTER | Age: 29
End: 2024-01-23
Attending: EMERGENCY MEDICINE
Payer: COMMERCIAL

## 2024-01-23 VITALS
TEMPERATURE: 98.6 F | SYSTOLIC BLOOD PRESSURE: 128 MMHG | OXYGEN SATURATION: 96 % | WEIGHT: 118.83 LBS | BODY MASS INDEX: 21.05 KG/M2 | RESPIRATION RATE: 16 BRPM | DIASTOLIC BLOOD PRESSURE: 64 MMHG | HEIGHT: 63 IN | HEART RATE: 80 BPM

## 2024-01-23 DIAGNOSIS — M79.662 PAIN OF LEFT LOWER LEG: ICD-10-CM

## 2024-01-23 PROCEDURE — 99283 EMERGENCY DEPT VISIT LOW MDM: CPT

## 2024-01-23 PROCEDURE — 93971 EXTREMITY STUDY: CPT | Mod: LT

## 2024-01-24 NOTE — ED TRIAGE NOTES
"28 yr old female to triage  Chief Complaint   Patient presents with    Leg Pain     Patient report of left calf throbbing pain today.  No left lower leg swelling noted.   Patient had surgery a week ago (outpatient). No shortness of breath.      BP (!) 143/77   Pulse 95   Temp 36.3 °C (97.4 °F) (Temporal)   Resp 18   Ht 1.6 m (5' 3\")   Wt 53.9 kg (118 lb 13.3 oz)   LMP 12/23/2023 (Approximate)   SpO2 97%   BMI 21.05 kg/m²     "

## 2024-01-24 NOTE — ED PROVIDER NOTES
ED Provider Note    CHIEF COMPLAINT  Chief Complaint   Patient presents with    Leg Pain     Patient report of left calf throbbing pain today.  No left lower leg swelling noted.   Patient had surgery a week ago (outpatient). No shortness of breath.        EXTERNAL RECORDS REVIEWED  Outpatient Notes patient was seen at [facility] 1/3/2022 for concern for upper respiratory infection.    HPI/ROS  LIMITATION TO HISTORY   Select: : None  OUTSIDE HISTORIAN(S):  none    Hieu Stroud is a 28 y.o. female who presents with some left calf cramping and throbbing that started earlier today.  She has not had any swelling.  She denies any chest pain or shortness of breath.  The patient had a breast augmentation January 15, 2024.  She has not had any complications until today.  She was sent here for evaluation for possible blood clot in her leg.  Patient is a non-smoker.  She denies any fevers or chills.  She denies any dizziness or lightheadedness chest pain or shortness of breath.    PAST MEDICAL HISTORY   has a past medical history of Pneumonia.    SURGICAL HISTORY   has a past surgical history that includes extensor tendon repair (Right, 11/19/2021); other abdominal surgery; and other (Bilateral).    FAMILY HISTORY  History reviewed. No pertinent family history.    SOCIAL HISTORY  Social History     Tobacco Use    Smoking status: Never    Smokeless tobacco: Never   Vaping Use    Vaping Use: Never used   Substance and Sexual Activity    Alcohol use: Yes     Comment: socially, occasional    Drug use: No    Sexual activity: Yes     Partners: Male     Birth control/protection: Pill       CURRENT MEDICATIONS  Home Medications       Reviewed by Adina Howard R.N. (Registered Nurse) on 01/23/24 at 1757  Med List Status: Complete     Medication Last Dose Status   multivitamin Tab 2 days ago Active                    ALLERGIES  Allergies   Allergen Reactions    Hydrocodone Hives    Kiwi Extract      Tongue  "itch       PHYSICAL EXAM  VITAL SIGNS: BP (!) 143/77   Pulse 95   Temp 36.3 °C (97.4 °F) (Temporal)   Resp 18   Ht 1.6 m (5' 3\")   Wt 53.9 kg (118 lb 13.3 oz)   LMP 12/23/2023 (Approximate)   SpO2 97%   BMI 21.05 kg/m²      Constitutional: No distress  Skin: Pink warm and dry without any erythema or abrasions  Musculoskeletal: Tenderness to the posterior left calf without edema venous cording or Homans' sign she has normal range of motion  Vascular: warm to touch good capillary refill   Neurologic: distally neurovascularly intact  Psychiatric: Affect normal     DIAGNOSTIC STUDIES / PROCEDURES  EKG  I have independently interpreted this EKG  none    LABS  none    RADIOLOGY  I have independently interpreted the diagnostic imaging associated with this visit and am waiting the final reading from the radiologist.   My preliminary interpretation is as follows: Ultrasound leg no dvt  Radiologist interpretation:   US-EXTREMITY VENOUS LOWER UNILAT LEFT   Final Result      No evidence of left lower extremity deep venous thrombosis.            COURSE & MEDICAL DECISION MAKING    ED Observation Status? No; Patient does not meet criteria for ED Observation.     INITIAL ASSESSMENT, COURSE AND PLAN  Care Narrative: Hieu Stroud is a 28 y.o. female who presents with some left calf cramping and throbbing that started earlier today.  She has not had any swelling.  She denies any chest pain or shortness of breath.  The patient had a breast augmentation January 15, 2024.  She has not had any complications until today.  She was sent here for evaluation for possible blood clot in her leg.  Patient is a non-smoker.  She denies any fevers or chills.  She denies any dizziness or lightheadedness chest pain or shortness of breath.  Physical exam her left lower extremity is pink warm and dry there is no calf swelling she has no venous cording or Homans' sign normal sensation and normal range of motion        ADDITIONAL PROBLEM " LIST  Recent breast augmentation surgery 1/15/2024  DISPOSITION AND DISCUSSIONS    The patient's duplex ultrasound of her left lower extremity does not show a DVT.  I was able to reassure the patient and she will be discharged home in care of her family.  If she does have any worsening pain redness or swelling to the leg chest pain or shortness of breath she should return for recheck and repeat ultrasound.  I advised her to take Tylenol as needed and stretch and elevate her leg.  I have discussed management of the patient with the following physicians and SRAVANI's: None    Discussion of management with other QHP or appropriate source(s): None     Escalation of care considered, and ultimately not performed:none    Barriers to care at this time, including but not limited to: none.     Decision tools and prescription drugs considered including, but not limited to: none.  The patient will return for new or worsening symptoms and is stable at the time of discharge.    The patient is referred to a primary physician for blood pressure management, diabetic screening, and for all other preventative health concerns.      DISPOSITION:  Patient will be discharged home in stable condition.    FOLLOW UP:  Ernesto Serrato M.D.  [address]  [address]  [facility] [address]  [phone]      As needed, If symptoms worsen    [facility], Emergency Dept  [address]  [place] [id number] [phone]    As needed, If symptoms worsen      OUTPATIENT MEDICATIONS:  New Prescriptions    No medications on file       FINAL DIAGNOSIS  1. Pain of left lower leg           Electronically signed by: Roberta Huertas M.D., 1/23/2024 7:34 PM

## 2024-02-07 ENCOUNTER — OFFICE VISIT (OUTPATIENT)
Dept: MEDICAL GROUP | Facility: LAB | Age: 29
End: 2024-02-07
Payer: COMMERCIAL

## 2024-02-07 VITALS
WEIGHT: 126 LBS | HEART RATE: 69 BPM | SYSTOLIC BLOOD PRESSURE: 98 MMHG | TEMPERATURE: 97.9 F | OXYGEN SATURATION: 100 % | DIASTOLIC BLOOD PRESSURE: 60 MMHG | HEIGHT: 63 IN | BODY MASS INDEX: 22.32 KG/M2 | RESPIRATION RATE: 12 BRPM

## 2024-02-07 DIAGNOSIS — Z11.59 SCREENING FOR VIRAL DISEASE: ICD-10-CM

## 2024-02-07 DIAGNOSIS — Z00.00 WELL ADULT EXAM: ICD-10-CM

## 2024-02-07 PROCEDURE — 3078F DIAST BP <80 MM HG: CPT | Performed by: FAMILY MEDICINE

## 2024-02-07 PROCEDURE — 99395 PREV VISIT EST AGE 18-39: CPT | Performed by: FAMILY MEDICINE

## 2024-02-07 PROCEDURE — 3074F SYST BP LT 130 MM HG: CPT | Performed by: FAMILY MEDICINE

## 2024-02-07 SDOH — HEALTH STABILITY: PHYSICAL HEALTH: ON AVERAGE, HOW MANY MINUTES DO YOU ENGAGE IN EXERCISE AT THIS LEVEL?: 60 MIN

## 2024-02-07 SDOH — ECONOMIC STABILITY: FOOD INSECURITY: WITHIN THE PAST 12 MONTHS, THE FOOD YOU BOUGHT JUST DIDN'T LAST AND YOU DIDN'T HAVE MONEY TO GET MORE.: NEVER TRUE

## 2024-02-07 SDOH — ECONOMIC STABILITY: HOUSING INSECURITY
IN THE LAST 12 MONTHS, WAS THERE A TIME WHEN YOU DID NOT HAVE A STEADY PLACE TO SLEEP OR SLEPT IN A SHELTER (INCLUDING NOW)?: NO

## 2024-02-07 SDOH — ECONOMIC STABILITY: INCOME INSECURITY: IN THE LAST 12 MONTHS, WAS THERE A TIME WHEN YOU WERE NOT ABLE TO PAY THE MORTGAGE OR RENT ON TIME?: NO

## 2024-02-07 SDOH — ECONOMIC STABILITY: TRANSPORTATION INSECURITY
IN THE PAST 12 MONTHS, HAS LACK OF RELIABLE TRANSPORTATION KEPT YOU FROM MEDICAL APPOINTMENTS, MEETINGS, WORK OR FROM GETTING THINGS NEEDED FOR DAILY LIVING?: NO

## 2024-02-07 SDOH — ECONOMIC STABILITY: FOOD INSECURITY: WITHIN THE PAST 12 MONTHS, YOU WORRIED THAT YOUR FOOD WOULD RUN OUT BEFORE YOU GOT MONEY TO BUY MORE.: NEVER TRUE

## 2024-02-07 SDOH — ECONOMIC STABILITY: TRANSPORTATION INSECURITY
IN THE PAST 12 MONTHS, HAS LACK OF TRANSPORTATION KEPT YOU FROM MEETINGS, WORK, OR FROM GETTING THINGS NEEDED FOR DAILY LIVING?: NO

## 2024-02-07 SDOH — ECONOMIC STABILITY: TRANSPORTATION INSECURITY
IN THE PAST 12 MONTHS, HAS THE LACK OF TRANSPORTATION KEPT YOU FROM MEDICAL APPOINTMENTS OR FROM GETTING MEDICATIONS?: NO

## 2024-02-07 SDOH — HEALTH STABILITY: PHYSICAL HEALTH: ON AVERAGE, HOW MANY DAYS PER WEEK DO YOU ENGAGE IN MODERATE TO STRENUOUS EXERCISE (LIKE A BRISK WALK)?: 5 DAYS

## 2024-02-07 SDOH — HEALTH STABILITY: MENTAL HEALTH
STRESS IS WHEN SOMEONE FEELS TENSE, NERVOUS, ANXIOUS, OR CAN'T SLEEP AT NIGHT BECAUSE THEIR MIND IS TROUBLED. HOW STRESSED ARE YOU?: NOT AT ALL

## 2024-02-07 SDOH — ECONOMIC STABILITY: HOUSING INSECURITY: IN THE LAST 12 MONTHS, HOW MANY PLACES HAVE YOU LIVED?: 1

## 2024-02-07 SDOH — ECONOMIC STABILITY: INCOME INSECURITY: HOW HARD IS IT FOR YOU TO PAY FOR THE VERY BASICS LIKE FOOD, HOUSING, MEDICAL CARE, AND HEATING?: NOT VERY HARD

## 2024-02-07 ASSESSMENT — LIFESTYLE VARIABLES
HOW OFTEN DO YOU HAVE SIX OR MORE DRINKS ON ONE OCCASION: NEVER
HOW MANY STANDARD DRINKS CONTAINING ALCOHOL DO YOU HAVE ON A TYPICAL DAY: 1 OR 2
SKIP TO QUESTIONS 9-10: 1
HOW OFTEN DO YOU HAVE A DRINK CONTAINING ALCOHOL: 2-4 TIMES A MONTH
AUDIT-C TOTAL SCORE: 2

## 2024-02-07 ASSESSMENT — SOCIAL DETERMINANTS OF HEALTH (SDOH)
IN A TYPICAL WEEK, HOW MANY TIMES DO YOU TALK ON THE PHONE WITH FAMILY, FRIENDS, OR NEIGHBORS?: MORE THAN THREE TIMES A WEEK
HOW OFTEN DO YOU ATTENT MEETINGS OF THE CLUB OR ORGANIZATION YOU BELONG TO?: PATIENT DECLINED
DO YOU BELONG TO ANY CLUBS OR ORGANIZATIONS SUCH AS CHURCH GROUPS UNIONS, FRATERNAL OR ATHLETIC GROUPS, OR SCHOOL GROUPS?: NO
IN A TYPICAL WEEK, HOW MANY TIMES DO YOU TALK ON THE PHONE WITH FAMILY, FRIENDS, OR NEIGHBORS?: MORE THAN THREE TIMES A WEEK
HOW OFTEN DO YOU ATTEND CHURCH OR RELIGIOUS SERVICES?: 1 TO 4 TIMES PER YEAR
DO YOU BELONG TO ANY CLUBS OR ORGANIZATIONS SUCH AS CHURCH GROUPS UNIONS, FRATERNAL OR ATHLETIC GROUPS, OR SCHOOL GROUPS?: NO
ARE YOU MARRIED, WIDOWED, DIVORCED, SEPARATED, NEVER MARRIED, OR LIVING WITH A PARTNER?: LIVING WITH PARTNER
HOW HARD IS IT FOR YOU TO PAY FOR THE VERY BASICS LIKE FOOD, HOUSING, MEDICAL CARE, AND HEATING?: NOT VERY HARD
HOW MANY DRINKS CONTAINING ALCOHOL DO YOU HAVE ON A TYPICAL DAY WHEN YOU ARE DRINKING: 1 OR 2
HOW OFTEN DO YOU HAVE SIX OR MORE DRINKS ON ONE OCCASION: NEVER
WITHIN THE PAST 12 MONTHS, YOU WORRIED THAT YOUR FOOD WOULD RUN OUT BEFORE YOU GOT THE MONEY TO BUY MORE: NEVER TRUE
ARE YOU MARRIED, WIDOWED, DIVORCED, SEPARATED, NEVER MARRIED, OR LIVING WITH A PARTNER?: LIVING WITH PARTNER
HOW OFTEN DO YOU ATTEND CHURCH OR RELIGIOUS SERVICES?: 1 TO 4 TIMES PER YEAR
HOW OFTEN DO YOU ATTENT MEETINGS OF THE CLUB OR ORGANIZATION YOU BELONG TO?: PATIENT DECLINED
HOW OFTEN DO YOU GET TOGETHER WITH FRIENDS OR RELATIVES?: ONCE A WEEK
HOW OFTEN DO YOU GET TOGETHER WITH FRIENDS OR RELATIVES?: ONCE A WEEK
HOW OFTEN DO YOU HAVE A DRINK CONTAINING ALCOHOL: 2-4 TIMES A MONTH

## 2024-02-07 ASSESSMENT — PATIENT HEALTH QUESTIONNAIRE - PHQ9: CLINICAL INTERPRETATION OF PHQ2 SCORE: 0

## 2024-02-07 NOTE — PROGRESS NOTES
"Hieu Stroud is a 28 y.o. female here to establish care.  No acute concerns.  Did have recent ER visit to rule out DVT after surgery but ultrasound negative and symptoms have resolved.  No chronic medical conditions, no current medications.  Exercises regularly, healthy diet, no tobacco, alcohol socially in moderation.  Pap up-to-date, records are requested.      Current medicines (including changes today)  Current Outpatient Medications   Medication Sig Dispense Refill    multivitamin Tab Take 1 Tablet by mouth every day.       No current facility-administered medications for this visit.     She  has a past medical history of Pneumonia.  She  has a past surgical history that includes extensor tendon repair (Right, 11/19/2021); other abdominal surgery; and other (Bilateral).  Social History     Tobacco Use    Smoking status: Never    Smokeless tobacco: Never   Vaping Use    Vaping Use: Never used   Substance Use Topics    Alcohol use: Yes     Comment: socially, occasional    Drug use: No     Social History     Social History Narrative    Not on file     No family history on file.  No family status information on file.       ROS  See HPI     Objective:     Physical Exam:  BP 98/60   Pulse 69   Temp 36.6 °C (97.9 °F) (Temporal)   Resp 12   Ht 1.6 m (5' 3\")   Wt 57.2 kg (126 lb)   SpO2 100%  Body mass index is 22.32 kg/m².  Constitutional: Alert. Well appearing. No distress.  Skin: Warm, dry, good turgor, no visible rashes.  ENMT: Moist mucous membranes. Normal dentition.   Neck: Trachea midline, no masses, no thyromegaly.  Respiratory: Normal effort. Lungs are clear to auscultation bilaterally.  Cardiovascular: Regular rate and rhythm. Normal S1/S2. No murmurs, rubs or gallops.   Neuro: Moves all four extremities. No facial droop.  Psych: Answers questions appropriately. Normal affect and mood.    Assessment and Plan:     1. Well adult exam  Health maintenance reviewed and updated.  Age-appropriate " anticipatory guidance provided.  Labs as below.  Requesting record for Pap.  Declines flu vaccine.  - CBC WITH DIFFERENTIAL; Future  - Comp Metabolic Panel; Future  - Lipid Profile; Future    2. Screening for viral disease  - HEP C VIRUS ANTIBODY; Future  - HIV AG/AB COMBO ASSAY SCREENING; Future    Follow up: Return in about 1 year (around 2/7/2025), or if symptoms worsen or fail to improve.         PLEASE NOTE: This note was created using voice recognition software.

## 2024-02-07 NOTE — LETTER
[facility]  Jermaine Mena M.D.  [id number]  [place] [address]  Fax: [phone]   Authorization for Release/Disclosure of   Protected Health Information   Name: NAUN SCHMIDT : 1995 SSN: [id number]   Address: [address] [place] [address]  [place] [address] Phone:    [phone] (home)    I authorize the entity listed below to release/disclose the PHI below to:   [facility]/Jermaine Mena M.D. and Jermaine Mena M.D.   Provider or Entity Name:     [facility]   Address   City, State, Zip   Phone:      Fax:     Reason for request: continuity of care   Information to be released:    [  ] LAST COLONOSCOPY,  including any PATH REPORT and follow-up  [  ] LAST FIT/COLOGUARD RESULT [  ] LAST DEXA  [  ] LAST MAMMOGRAM  [  ] LAST PAP  [  ] LAST LABS [  ] RETINA EXAM REPORT  [  ] IMMUNIZATION RECORDS  [  ] Release all info      [  ] Check here and initial the line next to each item to release ALL health information INCLUDING  _____ Care and treatment for drug and / or alcohol abuse  _____ HIV testing, infection status, or AIDS  _____ Genetic Testing    DATES OF SERVICE OR TIME PERIOD TO BE DISCLOSED: _____________  I understand and acknowledge that:  * This Authorization may be revoked at any time by you in writing, except if your health information has already been used or disclosed.  * Your health information that will be used or disclosed as a result of you signing this authorization could be re-disclosed by the recipient. If this occurs, your re-disclosed health information may no longer be protected by State or Federal laws.  * You may refuse to sign this Authorization. Your refusal will not affect your ability to obtain treatment.  * This Authorization becomes effective upon signing and will  on (date) __________.      If no date is indicated, this Authorization will  one (1) year from the signature date.    Name: Naun Schmidt  Signature: Date:    2/7/2024     PLEASE FAX REQUESTED RECORDS BACK TO: [phone]

## 2024-02-21 ENCOUNTER — HOSPITAL ENCOUNTER (OUTPATIENT)
Dept: LAB | Facility: MEDICAL CENTER | Age: 29
End: 2024-02-21
Attending: FAMILY MEDICINE
Payer: COMMERCIAL

## 2024-02-21 DIAGNOSIS — Z11.59 SCREENING FOR VIRAL DISEASE: ICD-10-CM

## 2024-02-21 DIAGNOSIS — Z00.00 WELL ADULT EXAM: ICD-10-CM

## 2024-02-21 LAB
ALBUMIN SERPL BCP-MCNC: 4.6 G/DL (ref 3.2–4.9)
ALBUMIN/GLOB SERPL: 1.5 G/DL
ALP SERPL-CCNC: 51 U/L (ref 30–99)
ALT SERPL-CCNC: 12 U/L (ref 2–50)
ANION GAP SERPL CALC-SCNC: 9 MMOL/L (ref 7–16)
AST SERPL-CCNC: 19 U/L (ref 12–45)
BASOPHILS # BLD AUTO: 0.3 % (ref 0–1.8)
BASOPHILS # BLD: 0.02 K/UL (ref 0–0.12)
BILIRUB SERPL-MCNC: 1.2 MG/DL (ref 0.1–1.5)
BUN SERPL-MCNC: 8 MG/DL (ref 8–22)
CALCIUM ALBUM COR SERPL-MCNC: 9 MG/DL (ref 8.5–10.5)
CALCIUM SERPL-MCNC: 9.5 MG/DL (ref 8.5–10.5)
CHLORIDE SERPL-SCNC: 102 MMOL/L (ref 96–112)
CHOLEST SERPL-MCNC: 139 MG/DL (ref 100–199)
CO2 SERPL-SCNC: 25 MMOL/L (ref 20–33)
CREAT SERPL-MCNC: 0.64 MG/DL (ref 0.5–1.4)
EOSINOPHIL # BLD AUTO: 0.05 K/UL (ref 0–0.51)
EOSINOPHIL NFR BLD: 0.6 % (ref 0–6.9)
ERYTHROCYTE [DISTWIDTH] IN BLOOD BY AUTOMATED COUNT: 39.6 FL (ref 35.9–50)
GFR SERPLBLD CREATININE-BSD FMLA CKD-EPI: 124 ML/MIN/1.73 M 2
GLOBULIN SER CALC-MCNC: 3 G/DL (ref 1.9–3.5)
GLUCOSE SERPL-MCNC: 84 MG/DL (ref 65–99)
HCT VFR BLD AUTO: 42.1 % (ref 37–47)
HCV AB SER QL: NORMAL
HDLC SERPL-MCNC: 70 MG/DL
HGB BLD-MCNC: 14.1 G/DL (ref 12–16)
HIV 1+2 AB+HIV1 P24 AG SERPL QL IA: NORMAL
IMM GRANULOCYTES # BLD AUTO: 0.02 K/UL (ref 0–0.11)
IMM GRANULOCYTES NFR BLD AUTO: 0.3 % (ref 0–0.9)
LDLC SERPL CALC-MCNC: 62 MG/DL
LYMPHOCYTES # BLD AUTO: 2.58 K/UL (ref 1–4.8)
LYMPHOCYTES NFR BLD: 32.6 % (ref 22–41)
MCH RBC QN AUTO: 30.1 PG (ref 27–33)
MCHC RBC AUTO-ENTMCNC: 33.5 G/DL (ref 32.2–35.5)
MCV RBC AUTO: 90 FL (ref 81.4–97.8)
MONOCYTES # BLD AUTO: 0.44 K/UL (ref 0–0.85)
MONOCYTES NFR BLD AUTO: 5.6 % (ref 0–13.4)
NEUTROPHILS # BLD AUTO: 4.81 K/UL (ref 1.82–7.42)
NEUTROPHILS NFR BLD: 60.6 % (ref 44–72)
NRBC # BLD AUTO: 0 K/UL
NRBC BLD-RTO: 0 /100 WBC (ref 0–0.2)
PLATELET # BLD AUTO: 223 K/UL (ref 164–446)
PMV BLD AUTO: 10.6 FL (ref 9–12.9)
POTASSIUM SERPL-SCNC: 4 MMOL/L (ref 3.6–5.5)
PROT SERPL-MCNC: 7.6 G/DL (ref 6–8.2)
RBC # BLD AUTO: 4.68 M/UL (ref 4.2–5.4)
SODIUM SERPL-SCNC: 136 MMOL/L (ref 135–145)
TRIGL SERPL-MCNC: 35 MG/DL (ref 0–149)
WBC # BLD AUTO: 7.9 K/UL (ref 4.8–10.8)

## 2024-02-21 PROCEDURE — 86803 HEPATITIS C AB TEST: CPT

## 2024-02-21 PROCEDURE — 85025 COMPLETE CBC W/AUTO DIFF WBC: CPT

## 2024-02-21 PROCEDURE — 80053 COMPREHEN METABOLIC PANEL: CPT

## 2024-02-21 PROCEDURE — 80061 LIPID PANEL: CPT

## 2024-02-21 PROCEDURE — 87389 HIV-1 AG W/HIV-1&-2 AB AG IA: CPT

## 2024-02-21 PROCEDURE — 36415 COLL VENOUS BLD VENIPUNCTURE: CPT

## 2024-04-24 ENCOUNTER — APPOINTMENT (OUTPATIENT)
Dept: MEDICAL GROUP | Facility: LAB | Age: 29
End: 2024-04-24
Payer: COMMERCIAL

## 2024-12-11 ENCOUNTER — APPOINTMENT (OUTPATIENT)
Dept: MEDICAL GROUP | Facility: LAB | Age: 29
End: 2024-12-11
Payer: COMMERCIAL

## 2024-12-29 ENCOUNTER — APPOINTMENT (OUTPATIENT)
Dept: URGENT CARE | Facility: CLINIC | Age: 29
End: 2024-12-29
Payer: COMMERCIAL

## 2025-01-02 ENCOUNTER — APPOINTMENT (OUTPATIENT)
Dept: MEDICAL GROUP | Facility: LAB | Age: 30
End: 2025-01-02
Payer: COMMERCIAL

## 2025-01-02 VITALS
WEIGHT: 114.8 LBS | BODY MASS INDEX: 20.34 KG/M2 | TEMPERATURE: 98.7 F | HEIGHT: 63 IN | HEART RATE: 89 BPM | SYSTOLIC BLOOD PRESSURE: 114 MMHG | RESPIRATION RATE: 12 BRPM | DIASTOLIC BLOOD PRESSURE: 82 MMHG | OXYGEN SATURATION: 99 %

## 2025-01-02 DIAGNOSIS — Z00.00 WELL ADULT EXAM: ICD-10-CM

## 2025-01-02 DIAGNOSIS — R68.2 DRY MOUTH: ICD-10-CM

## 2025-01-02 DIAGNOSIS — J01.00 ACUTE NON-RECURRENT MAXILLARY SINUSITIS: ICD-10-CM

## 2025-01-02 PROCEDURE — 3074F SYST BP LT 130 MM HG: CPT | Performed by: FAMILY MEDICINE

## 2025-01-02 PROCEDURE — 3079F DIAST BP 80-89 MM HG: CPT | Performed by: FAMILY MEDICINE

## 2025-01-02 PROCEDURE — 99214 OFFICE O/P EST MOD 30 MIN: CPT | Performed by: FAMILY MEDICINE

## 2025-01-02 RX ORDER — BENZONATATE 100 MG/1
100 CAPSULE ORAL 3 TIMES DAILY PRN
Qty: 60 CAPSULE | Refills: 0 | Status: SHIPPED | OUTPATIENT
Start: 2025-01-02

## 2025-01-02 RX ORDER — FLUTICASONE PROPIONATE 50 MCG
1 SPRAY, SUSPENSION (ML) NASAL DAILY
COMMUNITY

## 2025-01-02 ASSESSMENT — FIBROSIS 4 INDEX: FIB4 SCORE: 0.69

## 2025-01-02 NOTE — PROGRESS NOTES
"Subjective:     CC: Sinus infection, dry mouth    HPI:   Hieu presents today with multiple concerns.    Initially with upper respiratory and sinus congestion that began about 9 days ago.  She was using Sudafed and Robitussin and noticed issues with severe dry mouth, low appetite, and anxiety.  Went to [facility] walk-in clinic 2 days ago and was told to switch to Flonase and Mucinex.  Has been using Mucinex cold and flu which also includes dextromethorphan and phenylephrine.  Continuing to have dry mouth which has needed very hard to eat and cause some anxiety and even panic type symptoms.  Sinus congestion also continues and is severe.  Also with continued sinus headache.  Cough lingering as well, no fevers or trouble breathing.    Current Outpatient Medications   Medication Sig Dispense Refill    fluticasone (FLONASE) 50 MCG/ACT nasal spray Administer 1 Spray into affected nostril(S) every day.      multivitamin Tab Take 1 Tablet by mouth every day.       No current facility-administered medications for this visit.       Medications, past medical history, allergies, and social history have been reviewed and updated.      Objective:       Exam:  /82 (BP Location: Left arm, Patient Position: Sitting, BP Cuff Size: Adult)   Pulse 89   Temp 37.1 °C (98.7 °F) (Temporal)   Resp 12   Ht 1.6 m (5' 3\")   Wt 52.1 kg (114 lb 12.8 oz)   SpO2 99%   BMI 20.34 kg/m²  Body mass index is 20.34 kg/m².    Constitutional: Alert. Well appearing. No distress.  Skin: Warm, dry, good turgor, no visible rashes.  ENMT: Mouth is dry.  Normal dentition.  No oral lesions.  No cervical LAD.  Tympanic membranes are clear.  Thick nasal congestion present.  Respiratory: Normal effort. Lungs are clear to auscultation bilaterally.  Cardiovascular: Regular rate and rhythm. Normal S1/S2. No murmurs, rubs or gallops.   Neuro: Moves all four extremities. No facial droop.  Psych: Answers questions appropriately. Normal affect and " mood.    Assessment & Plan:     29 y.o. female with the following -     1. Dry mouth  Suspect this is likely a side effect of the over-the-counter cold medicine she has been using.  Recommend discontinuing all OTC cough and cold medicines.  Can continue OTC mouth rinse, try sour candies or cough drops.  Follow-up if not improving.    2. Acute non-recurrent maxillary sinusitis  Persistent sinus congestion for 9+ days not improving and also with sinus headaches.  Persistent symptoms will cover for bacterial sinusitis.  As above recommended she discontinue OTC cough/cold medications but can try Tessalon as needed for cough if the cough continues.  - amoxicillin-clavulanate (AUGMENTIN) 875-125 MG Tab; Take 1 Tablet by mouth 2 times a day for 5 days.  Dispense: 10 Tablet; Refill: 0  - benzonatate (TESSALON) 100 MG Cap; Take 1 Capsule by mouth 3 times a day as needed for Cough.  Dispense: 60 Capsule; Refill: 0    3. Well adult exam  - CBC WITH DIFFERENTIAL; Future  - Comp Metabolic Panel; Future  - TSH WITH REFLEX TO FT4; Future    She also mentions some chronic issues with hands being cold and changing color at end of visit.  Briefly discussed.  Checking basic labs as above.  Discussed possibility of Raynaud's.  She will follow-up if symptoms persist to discuss further workup.    Please note that this note was created using voice recognition software.

## 2025-01-10 ENCOUNTER — HOSPITAL ENCOUNTER (OUTPATIENT)
Facility: MEDICAL CENTER | Age: 30
End: 2025-01-10
Attending: FAMILY MEDICINE
Payer: COMMERCIAL

## 2025-01-10 DIAGNOSIS — Z00.00 WELL ADULT EXAM: ICD-10-CM

## 2025-01-10 LAB
ALBUMIN SERPL BCP-MCNC: 4.7 G/DL (ref 3.2–4.9)
ALBUMIN/GLOB SERPL: 1.7 G/DL
ALP SERPL-CCNC: 56 U/L (ref 30–99)
ALT SERPL-CCNC: 19 U/L (ref 2–50)
ANION GAP SERPL CALC-SCNC: 13 MMOL/L (ref 7–16)
AST SERPL-CCNC: 21 U/L (ref 12–45)
BASOPHILS # BLD AUTO: 0.3 % (ref 0–1.8)
BASOPHILS # BLD: 0.02 K/UL (ref 0–0.12)
BILIRUB SERPL-MCNC: 1.8 MG/DL (ref 0.1–1.5)
BUN SERPL-MCNC: 17 MG/DL (ref 8–22)
CALCIUM ALBUM COR SERPL-MCNC: 8.6 MG/DL (ref 8.5–10.5)
CALCIUM SERPL-MCNC: 9.2 MG/DL (ref 8.5–10.5)
CHLORIDE SERPL-SCNC: 101 MMOL/L (ref 96–112)
CO2 SERPL-SCNC: 23 MMOL/L (ref 20–33)
CREAT SERPL-MCNC: 0.73 MG/DL (ref 0.5–1.4)
EOSINOPHIL # BLD AUTO: 0.07 K/UL (ref 0–0.51)
EOSINOPHIL NFR BLD: 1 % (ref 0–6.9)
ERYTHROCYTE [DISTWIDTH] IN BLOOD BY AUTOMATED COUNT: 39.2 FL (ref 35.9–50)
GFR SERPLBLD CREATININE-BSD FMLA CKD-EPI: 115 ML/MIN/1.73 M 2
GLOBULIN SER CALC-MCNC: 2.8 G/DL (ref 1.9–3.5)
GLUCOSE SERPL-MCNC: 98 MG/DL (ref 65–99)
HCT VFR BLD AUTO: 40.7 % (ref 37–47)
HGB BLD-MCNC: 13.7 G/DL (ref 12–16)
IMM GRANULOCYTES # BLD AUTO: 0.02 K/UL (ref 0–0.11)
IMM GRANULOCYTES NFR BLD AUTO: 0.3 % (ref 0–0.9)
LYMPHOCYTES # BLD AUTO: 2.15 K/UL (ref 1–4.8)
LYMPHOCYTES NFR BLD: 31.5 % (ref 22–41)
MCH RBC QN AUTO: 30.5 PG (ref 27–33)
MCHC RBC AUTO-ENTMCNC: 33.7 G/DL (ref 32.2–35.5)
MCV RBC AUTO: 90.6 FL (ref 81.4–97.8)
MONOCYTES # BLD AUTO: 0.46 K/UL (ref 0–0.85)
MONOCYTES NFR BLD AUTO: 6.7 % (ref 0–13.4)
NEUTROPHILS # BLD AUTO: 4.11 K/UL (ref 1.82–7.42)
NEUTROPHILS NFR BLD: 60.2 % (ref 44–72)
NRBC # BLD AUTO: 0 K/UL
NRBC BLD-RTO: 0 /100 WBC (ref 0–0.2)
PLATELET # BLD AUTO: 274 K/UL (ref 164–446)
PMV BLD AUTO: 10.9 FL (ref 9–12.9)
POTASSIUM SERPL-SCNC: 4.2 MMOL/L (ref 3.6–5.5)
PROT SERPL-MCNC: 7.5 G/DL (ref 6–8.2)
RBC # BLD AUTO: 4.49 M/UL (ref 4.2–5.4)
SODIUM SERPL-SCNC: 137 MMOL/L (ref 135–145)
TSH SERPL DL<=0.005 MIU/L-ACNC: 1.17 UIU/ML (ref 0.38–5.33)
WBC # BLD AUTO: 6.8 K/UL (ref 4.8–10.8)

## 2025-01-10 PROCEDURE — 85025 COMPLETE CBC W/AUTO DIFF WBC: CPT

## 2025-01-10 PROCEDURE — 36415 COLL VENOUS BLD VENIPUNCTURE: CPT

## 2025-01-10 PROCEDURE — 80053 COMPREHEN METABOLIC PANEL: CPT

## 2025-01-10 PROCEDURE — 84443 ASSAY THYROID STIM HORMONE: CPT

## 2025-01-14 DIAGNOSIS — R17 ELEVATED BILIRUBIN: ICD-10-CM

## 2025-01-20 ENCOUNTER — HOSPITAL ENCOUNTER (OUTPATIENT)
Facility: MEDICAL CENTER | Age: 30
End: 2025-01-20
Attending: FAMILY MEDICINE
Payer: COMMERCIAL

## 2025-01-20 DIAGNOSIS — R17 ELEVATED BILIRUBIN: ICD-10-CM

## 2025-01-20 LAB
ALBUMIN SERPL BCP-MCNC: 4.6 G/DL (ref 3.2–4.9)
ALP SERPL-CCNC: 51 U/L (ref 30–99)
ALT SERPL-CCNC: 18 U/L (ref 2–50)
AST SERPL-CCNC: 22 U/L (ref 12–45)
BILIRUB CONJ SERPL-MCNC: 0.3 MG/DL (ref 0.1–0.5)
BILIRUB INDIRECT SERPL-MCNC: 1.2 MG/DL (ref 0–1)
BILIRUB SERPL-MCNC: 1.5 MG/DL (ref 0.1–1.5)
PROT SERPL-MCNC: 7.2 G/DL (ref 6–8.2)

## 2025-01-20 PROCEDURE — 36415 COLL VENOUS BLD VENIPUNCTURE: CPT

## 2025-01-20 PROCEDURE — 80076 HEPATIC FUNCTION PANEL: CPT

## 2025-02-12 ENCOUNTER — APPOINTMENT (OUTPATIENT)
Dept: MEDICAL GROUP | Facility: LAB | Age: 30
End: 2025-02-12
Payer: COMMERCIAL

## 2025-02-16 ENCOUNTER — HOSPITAL ENCOUNTER (EMERGENCY)
Facility: MEDICAL CENTER | Age: 30
End: 2025-02-16
Attending: EMERGENCY MEDICINE
Payer: COMMERCIAL

## 2025-02-16 VITALS
BODY MASS INDEX: 20.47 KG/M2 | HEIGHT: 63 IN | RESPIRATION RATE: 18 BRPM | WEIGHT: 115.52 LBS | TEMPERATURE: 96.1 F | OXYGEN SATURATION: 99 % | SYSTOLIC BLOOD PRESSURE: 135 MMHG | DIASTOLIC BLOOD PRESSURE: 86 MMHG | HEART RATE: 86 BPM

## 2025-02-16 DIAGNOSIS — R04.0 EPISTAXIS: Primary | ICD-10-CM

## 2025-02-16 PROCEDURE — 99282 EMERGENCY DEPT VISIT SF MDM: CPT

## 2025-02-16 PROCEDURE — A9270 NON-COVERED ITEM OR SERVICE: HCPCS | Performed by: EMERGENCY MEDICINE

## 2025-02-16 PROCEDURE — 700102 HCHG RX REV CODE 250 W/ 637 OVERRIDE(OP): Performed by: EMERGENCY MEDICINE

## 2025-02-16 RX ORDER — OXYMETAZOLINE HYDROCHLORIDE 0.05 G/100ML
2 SPRAY NASAL ONCE
Status: COMPLETED | OUTPATIENT
Start: 2025-02-16 | End: 2025-02-16

## 2025-02-16 RX ADMIN — OXYMETAZOLINE HYDROCHLORIDE 2 SPRAY: 0.5 SPRAY NASAL at 22:43

## 2025-02-16 ASSESSMENT — FIBROSIS 4 INDEX: FIB4 SCORE: 0.53

## 2025-02-17 NOTE — ED TRIAGE NOTES
Chief Complaint   Patient presents with    Epistaxis     Since 1940 left nare, denies trauma. Hx of cauterization to left nare 3 years ago.       Bleeding controlled     Vitals:    02/16/25 2102   BP: 112/73   Pulse: 88   Resp: 18   Temp: (!) 35.6 °C (96.1 °F)   SpO2: 100%     Patient ambulatory to triage for above complaint. Patient A&Ox4, GCS 15, patient speaking in full sentences. Equal and unlabored respirations. Patient educated on triage process and encouraged to notify staff if condition worsens. Patient returned to the lobby in stable condition.

## 2025-02-17 NOTE — DISCHARGE INSTRUCTIONS
Thankfully the bleeding is stopped.  Will leave the Nampon in place and you can take it out yourself when is been in for total 24 hours.  This unfortunately may cause resumption of bleeding.  If you do have bleeding the nurses can give you a bottle of Afrin to take home.  I want you to blow your nose out and immediately spray 2 sprays of Afrin up the affected nostril and clamp it for 5 minutes.  Repeat this process 2 more times and it will likely stop the bleeding.  If it does not, you can either replace another nasal tampon or return to the ED to have us evaluate for cauterization.  The name and number of the ENT as above, you can call their office to make an appointment to be seen.  I recommend sleeping with a warm air humidifier as well as placing Aquaphor and or Vaseline in both nostrils when going to bed at night.  Give any worsening symptoms come back to the ED.  Thank you for coming in tonight.

## 2025-02-17 NOTE — ED PROVIDER NOTES
ED Provider Note    Scribed for Jermaine Murcia by Daisy Camacho. 2/16/2025  10:06 PM    Primary care provider: Jermaine Mena M.D.  Means of arrival: Walk-In  History obtained from: Patient  History limited by: None    CHIEF COMPLAINT  Chief Complaint   Patient presents with    Epistaxis     Since 1940 left nare, denies trauma. Hx of cauterization to left nare 3 years ago.       EXTERNAL RECORDS REVIEWED  Outpatient Notes Patient seen at [facility] 1/2/2025 for dry mouth, sinusitis, and well adult exam.     HPI/ROS  LIMITATION TO HISTORY   Select: : None  OUTSIDE HISTORIAN(S):  None    HPI  Hieu Stroud is a 29 y.o. female who presents to the Emergency Department for epistaxis since 7:40 PM. Patient reports that the bleed started from her left nare and has not stopped for the past 2 hours, bleeding to the back of her throat. She denies any trauma. She reports a history of cauterization to the left nare three years ago. She reports that the cauterization helped for a while, but over the past year she has been getting a bleed from the left nare every week. She reports that normally she is able to stop it, get it to clot, but for this event she was not able to stop it. She reports the bleeding is the same year round, she notes trying a humidifier and Aquaphor but still will have bleeds. She denies having a work up done in the past for a bleeding disorder. She reports using Afrin in the past. She reports that now in the room the bleeding has slowed where she does not have any bleeding to the back of her mouth. There are no known alleviating or exacerbating factors. She reports that she always had nose bleeds, since she was young, but they have gotten worse.     REVIEW OF SYSTEMS  As above, all other systems reviewed and are negative.   See HPI for further details.     PAST MEDICAL HISTORY   has a past medical history of Pneumonia.  SURGICAL HISTORY   has a past surgical history that includes  "extensor tendon repair (Right, 11/19/2021); other abdominal surgery; and other (Bilateral).  SOCIAL HISTORY  Social History     Tobacco Use    Smoking status: Never    Smokeless tobacco: Never   Vaping Use    Vaping status: Never Used   Substance Use Topics    Alcohol use: Yes     Comment: socially, occasional    Drug use: No      Social History     Substance and Sexual Activity   Drug Use No     FAMILY HISTORY  History reviewed. No pertinent family history.  CURRENT MEDICATIONS  Home Medications       Reviewed by Bharath Fernandez R.N. (Registered Nurse) on 02/16/25 at 2108  Med List Status: Partial     Medication Last Dose Status   benzonatate (TESSALON) 100 MG Cap  Active   fluticasone (FLONASE) 50 MCG/ACT nasal spray  Active   multivitamin Tab  Active                  ALLERGIES  Allergies   Allergen Reactions    Hydrocodone Hives    Kiwi Extract      Tongue itch       PHYSICAL EXAM    VITAL SIGNS:   Vitals:    02/16/25 2102 02/16/25 2105 02/16/25 2200   BP: 112/73  135/86   Pulse: 88  86   Resp: 18  18   Temp: (!) 35.6 °C (96.1 °F)     TempSrc: Temporal     SpO2: 100%  99%   Weight:  52.4 kg (115 lb 8.3 oz)    Height:  1.6 m (5' 3\")      Vitals: My interpretation: normotensive, not tachycardic, afebrile, not hypoxic    Reinterpretation of vitals: Unchanged and unremarkable.      PE:   Gen: sitting comfortably, speaking clearly, appears in no acute distress   ENT: Mucous membranes moist, posterior pharynx clear, uvula midline  Nasal exam: Blood in bilateral naris, no blood in the posterior pharynx, dried blood in the left nare with a nasal tampon in place.  No active bleeding currently.  Neck: Supple, FROM  Pulmonary: Lungs are clear to auscultation bilaterally. No tachypnea  CV:  RRR, no murmur appreciated, pulses 2+ in both upper and lower extremities  Abdomen: soft, NT/ND; no rebound/guarding  : no CVA or suprapubic tenderness   Neuro: A&Ox4 (person, place, time, situation), speech fluent, gait steady, no " "focal deficits appreciated  Skin: No rash or lesions.  No pallor or jaundice.  No cyanosis.  Warm and dry.     COURSE & MEDICAL DECISION MAKING  Nursing notes, VS, PMSFHx, labs, imaging, EKG reviewed in chart.    ED Observation Status? No; Patient does not meet criteria for ED Observation.     Ddx: Posterior stacks, anterior epistaxis, dehydration    MDM: 10:06 PM Hieu Stroud is a 29 y.o. female who presented with evaluation of acute on chronic recurrent nasal bleeds with epistaxis.  Saw ENT a few years ago and had cauterization which lasted for a few years but has had resumption over the last few months of recurrent epistaxis.  No known anticoagulation disorders.  No blood thinners.  No history of hypertension.  Otherwise healthy.  Vital signs are unremarkable upon arrival here.  Patient placed a \"Nampod\" in the left actively bleeding near at home and now it has resolved.  No blood in the posterior pharynx, dried blood in the left nare,, otherwise unremarkable exam.  Vital signs unremarkable.  No concern for acute blood loss anemia.  At this time I discussed with the patient removal of the nasal tampon packing versus leaving in place having removed at the 24-hour mago.  She feels more comfortable with removing after 24 hours that she is concern for resumption of bleeding.  I did give her a clamp and a bottle of Afrin and described how to attempt bleeding control at home.  She verbalized understanding and an ENT referral was placed as well as given the patient the name and number of ENT on-call tonight.  Return precautions discussed she verbalized understanding is amenable.    ADDITIONAL PROBLEM LIST AND DISPOSITION    I have discussed management of the patient with the following physicians and SRAVANI's:  None    Discussion of management with other QHP or appropriate source(s): None     Escalation of care considered, and ultimately not performed:the patient was evaluated by myself, after discussion I have " recommended the patient to be discharged    Barriers to care at this time, including but not limited to:  None .     Decision tools and prescription drugs considered including, but not limited to:  Afrin spray .    FINAL IMPRESSION  1. Epistaxis Acute       Daisy GARCIA (Scribe), am scribing for, and in the presence of, Jermaine Murcia.    Electronically signed by: Daisy Camacho (Mamieibe), 2/16/2025    Jermaine GARCIA personally performed the services described in this documentation, as scribed by Daisy Camacho in my presence, and it is both accurate and complete.    The note accurately reflects work and decisions made by me.  Jermaine Murcia  2/17/2025  12:43 AM

## 2025-02-19 ENCOUNTER — APPOINTMENT (OUTPATIENT)
Dept: MEDICAL GROUP | Facility: LAB | Age: 30
End: 2025-02-19
Payer: COMMERCIAL

## 2025-02-28 ENCOUNTER — OFFICE VISIT (OUTPATIENT)
Dept: MEDICAL GROUP | Facility: LAB | Age: 30
End: 2025-02-28
Payer: COMMERCIAL

## 2025-02-28 ENCOUNTER — HOSPITAL ENCOUNTER (OUTPATIENT)
Facility: MEDICAL CENTER | Age: 30
End: 2025-02-28
Attending: FAMILY MEDICINE
Payer: COMMERCIAL

## 2025-02-28 VITALS
HEIGHT: 63 IN | SYSTOLIC BLOOD PRESSURE: 96 MMHG | OXYGEN SATURATION: 99 % | WEIGHT: 115 LBS | DIASTOLIC BLOOD PRESSURE: 62 MMHG | BODY MASS INDEX: 20.38 KG/M2 | HEART RATE: 73 BPM | RESPIRATION RATE: 14 BRPM | TEMPERATURE: 98.2 F

## 2025-02-28 DIAGNOSIS — N30.00 ACUTE CYSTITIS WITHOUT HEMATURIA: ICD-10-CM

## 2025-02-28 LAB
AMBIGUOUS DTTM AMBI4: NORMAL
APPEARANCE UR: CLEAR
BILIRUB UR STRIP-MCNC: NEGATIVE MG/DL
COLOR UR AUTO: YELLOW
GLUCOSE UR STRIP.AUTO-MCNC: NEGATIVE MG/DL
KETONES UR STRIP.AUTO-MCNC: NEGATIVE MG/DL
LEUKOCYTE ESTERASE UR QL STRIP.AUTO: NORMAL
NITRITE UR QL STRIP.AUTO: NEGATIVE
PH UR STRIP.AUTO: 6 [PH] (ref 5–8)
PROT UR QL STRIP: NEGATIVE MG/DL
RBC UR QL AUTO: NORMAL
SP GR UR STRIP.AUTO: 1.01
UROBILINOGEN UR STRIP-MCNC: 0.2 MG/DL

## 2025-02-28 PROCEDURE — 87086 URINE CULTURE/COLONY COUNT: CPT

## 2025-02-28 PROCEDURE — 87077 CULTURE AEROBIC IDENTIFY: CPT

## 2025-02-28 PROCEDURE — 87186 SC STD MICRODIL/AGAR DIL: CPT

## 2025-02-28 RX ORDER — NITROFURANTOIN 25; 75 MG/1; MG/1
100 CAPSULE ORAL 2 TIMES DAILY
Qty: 10 CAPSULE | Refills: 0 | Status: SHIPPED | OUTPATIENT
Start: 2025-02-28 | End: 2025-03-05

## 2025-02-28 ASSESSMENT — FIBROSIS 4 INDEX: FIB4 SCORE: 0.53

## 2025-02-28 ASSESSMENT — PATIENT HEALTH QUESTIONNAIRE - PHQ9: CLINICAL INTERPRETATION OF PHQ2 SCORE: 0

## 2025-02-28 NOTE — PROGRESS NOTES
"Subjective:     CC: UTI    HPI:   Hieu presents today with concern for UTI.  Symptoms began 5 days ago with dysuria and cloudy urine.  She did complete 3-day Bactrim course from [facility] but no change in symptoms.  No fevers.  Has had some possible mild right-sided low back pain but this feels more muscular.  No history of recent or recurrent UTI.  No history of drug-resistant UTI.    POCT UA small LE, trace blood    Current Outpatient Medications   Medication Sig Dispense Refill    multivitamin Tab Take 1 Tablet by mouth every day.      fluticasone (FLONASE) 50 MCG/ACT nasal spray Administer 1 Spray into affected nostril(S) every day. (Patient not taking: Reported on 2/28/2025)      benzonatate (TESSALON) 100 MG Cap Take 1 Capsule by mouth 3 times a day as needed for Cough. (Patient not taking: Reported on 2/28/2025) 60 Capsule 0     No current facility-administered medications for this visit.       Medications, past medical history, allergies, and social history have been reviewed and updated.      Objective:       Exam:  BP 96/62 (BP Location: Left arm, Patient Position: Sitting, BP Cuff Size: Adult)   Pulse 73   Temp 36.8 °C (98.2 °F) (Temporal)   Resp 14   Ht 1.6 m (5' 3\")   Wt 52.2 kg (115 lb)   LMP 02/12/2025   SpO2 99%   BMI 20.37 kg/m²  Body mass index is 20.37 kg/m².    Constitutional: Alert. Well appearing. No distress.  Skin: Warm, dry, good turgor, no visible rashes.  ENMT: Moist mucous membranes.   Respiratory: Normal effort.   Abdomen: Soft and nontender.  No CVA tenderness.  Neuro: Moves all four extremities. No facial droop.  Psych: Answers questions appropriately. Normal affect and mood.    Assessment & Plan:     29 y.o. female with the following -     1. Acute cystitis without hematuria    [facility].  POCT UA today with small LE and blood.  Culture sent and will switch to Macrobid.  Discussed reasons to seek " care.    - POCT Urinalysis  - URINE CULTURE(NEW); Future       Please note that this note was created using voice recognition software.

## 2025-03-03 LAB
BACTERIA UR CULT: ABNORMAL
BACTERIA UR CULT: ABNORMAL
SIGNIFICANT IND 70042: ABNORMAL
SITE SITE: ABNORMAL
SOURCE SOURCE: ABNORMAL

## 2025-03-04 ENCOUNTER — RESULTS FOLLOW-UP (OUTPATIENT)
Dept: MEDICAL GROUP | Facility: LAB | Age: 30
End: 2025-03-04
Payer: COMMERCIAL

## 2025-03-10 ENCOUNTER — HOSPITAL ENCOUNTER (OUTPATIENT)
Facility: MEDICAL CENTER | Age: 30
End: 2025-03-10
Attending: NURSE PRACTITIONER
Payer: COMMERCIAL

## 2025-03-10 ENCOUNTER — APPOINTMENT (OUTPATIENT)
Dept: URGENT CARE | Facility: PHYSICIAN GROUP | Age: 30
End: 2025-03-10
Payer: COMMERCIAL

## 2025-03-10 ENCOUNTER — OFFICE VISIT (OUTPATIENT)
Dept: URGENT CARE | Facility: PHYSICIAN GROUP | Age: 30
End: 2025-03-10
Payer: COMMERCIAL

## 2025-03-10 VITALS
OXYGEN SATURATION: 100 % | SYSTOLIC BLOOD PRESSURE: 106 MMHG | WEIGHT: 117 LBS | BODY MASS INDEX: 20.73 KG/M2 | HEART RATE: 76 BPM | DIASTOLIC BLOOD PRESSURE: 66 MMHG | RESPIRATION RATE: 18 BRPM | HEIGHT: 63 IN | TEMPERATURE: 98.2 F

## 2025-03-10 DIAGNOSIS — N30.00 ACUTE RECURRENT CYSTITIS: ICD-10-CM

## 2025-03-10 DIAGNOSIS — R30.0 DYSURIA: ICD-10-CM

## 2025-03-10 LAB
APPEARANCE UR: CLEAR
BILIRUB UR STRIP-MCNC: NEGATIVE MG/DL
COLOR UR AUTO: YELLOW
GLUCOSE UR STRIP.AUTO-MCNC: NEGATIVE MG/DL
KETONES UR STRIP.AUTO-MCNC: NEGATIVE MG/DL
LEUKOCYTE ESTERASE UR QL STRIP.AUTO: NORMAL
NITRITE UR QL STRIP.AUTO: NEGATIVE
PH UR STRIP.AUTO: 6 [PH] (ref 5–8)
PROT UR QL STRIP: NEGATIVE MG/DL
RBC UR QL AUTO: NORMAL
SP GR UR STRIP.AUTO: 1.01
UROBILINOGEN UR STRIP-MCNC: 0.2 MG/DL

## 2025-03-10 PROCEDURE — 87660 TRICHOMONAS VAGIN DIR PROBE: CPT

## 2025-03-10 PROCEDURE — 3074F SYST BP LT 130 MM HG: CPT | Performed by: NURSE PRACTITIONER

## 2025-03-10 PROCEDURE — 99214 OFFICE O/P EST MOD 30 MIN: CPT | Performed by: NURSE PRACTITIONER

## 2025-03-10 PROCEDURE — 3078F DIAST BP <80 MM HG: CPT | Performed by: NURSE PRACTITIONER

## 2025-03-10 PROCEDURE — 81002 URINALYSIS NONAUTO W/O SCOPE: CPT | Performed by: NURSE PRACTITIONER

## 2025-03-10 PROCEDURE — 87480 CANDIDA DNA DIR PROBE: CPT

## 2025-03-10 PROCEDURE — 87510 GARDNER VAG DNA DIR PROBE: CPT

## 2025-03-10 PROCEDURE — 87086 URINE CULTURE/COLONY COUNT: CPT

## 2025-03-10 RX ORDER — CEFDINIR 300 MG/1
300 CAPSULE ORAL 2 TIMES DAILY
Qty: 14 CAPSULE | Refills: 0 | Status: SHIPPED | OUTPATIENT
Start: 2025-03-10 | End: 2025-03-17

## 2025-03-10 ASSESSMENT — FIBROSIS 4 INDEX: FIB4 SCORE: 0.53

## 2025-03-10 NOTE — PROGRESS NOTES
Chief Complaint   Patient presents with    UTI     2 rounds of antibiotics and still having symptoms           History of Present Illness  The patient is a 29-year-old female who presents for evaluation of a urinary tract infection (UTI).    A few weeks ago, she experienced symptoms suggestive of a UTI and sought telehealth consultation, during which she was prescribed Bactrim. However, her symptoms did not improve, prompting her to visit her primary care physician. A urine culture was performed, and she was started on Macrobid (nitrofurantoin). The culture indicated sensitivity to this antibiotic, but her symptoms recurred upon cessation of the medication. She reports that her symptoms initially resolved but returned as a sensation of discomfort rather than pain during urination. Yesterday morning, she experienced a burning sensation during urination. She does not report any vaginal itching, irritation, or changes in discharge. The most severe pain occurs towards the end of urination. She also does not report any renal pain. She reports no risk of sexually transmitted infections (STIs) or sexually transmitted diseases (STDs). This is her first UTI in approximately 6 years.    MEDICATIONS  Past: Bactrim, Macrobid (nitrofurantoin)         ROS:    No severe shortness of breath   No Cardiac like chest pain, as discussed   As otherwise stated in HPI    Medical/SX/ Social History:  Reviewed per chart    Pertinent Medications:    Current Outpatient Medications on File Prior to Visit   Medication Sig Dispense Refill    multivitamin Tab Take 1 Tablet by mouth every day.       No current facility-administered medications on file prior to visit.        Allergies:    Hydrocodone and Kiwi extract     Problem list, medications, and allergies reviewed by myself today in Epic     Physical Exam:    Vitals:    03/10/25 1425   BP: 106/66   Pulse: 76   Resp: 18   Temp: 36.8 °C (98.2 °F)   SpO2: 100%             Physical  Exam  Constitutional:       General: She is not in acute distress.     Appearance: Normal appearance. She is well-developed. She is not ill-appearing or toxic-appearing.   HENT:      Head: Normocephalic and atraumatic.   Pulmonary:      Effort: Pulmonary effort is normal.   Abdominal:      General: Abdomen is flat. Bowel sounds are normal.      Palpations: Abdomen is soft.      Tenderness: There is no abdominal tenderness. There is no right CVA tenderness, left CVA tenderness, guarding or rebound.   Skin:     General: Skin is warm.      Capillary Refill: Capillary refill takes less than 2 seconds.      Coloration: Skin is not cyanotic or pale.   Neurological:      Mental Status: She is alert and oriented to person, place, and time.      Gait: Gait is intact.   Psychiatric:         Behavior: Behavior normal. Behavior is cooperative.            Diagnostics:    Results for orders placed or performed in visit on 03/10/25   POCT Urinalysis    Collection Time: 03/10/25  2:46 PM   Result Value Ref Range    POC Color Yellow Negative    POC Appearance Clear Negative    POC Glucose Negative Negative mg/dL    POC Bilirubin Negative Negative mg/dL    POC Ketones Negative Negative mg/dL    POC Specific Gravity 1.010 <1.005 - >1.030    POC Blood Trace-lysed Negative    POC Urine PH 6.0 5.0 - 8.0    POC Protein Negative Negative mg/dL    POC Urobiligen 0.2 Negative (0.2) mg/dL    POC Nitrites Negative Negative    POC Leukocyte Esterase Trace Negative     Vach path pending  Urine culture pending    Medical Decision making and plan :  I personally reviewed prior external notes and test results pertinent to today's visit. Pt is clinically stable at today's acute urgent care visit.  Patient appears nontoxic with no acute distress noted. Appropriate for outpatient care at this time.    Pleasant 29 y.o. female presented clinic with:     Assessment & Plan  1. Urinary Tract Infection (UTI).  The patient's urine analysis revealed the  presence of leukocytes and hematuria, indicative of a positive result. She reported recurrent symptoms after completing a course of nitrofurantoin. She is advised to incorporate probiotics into her diet, such as Greek yogurt with active cultures, to replenish beneficial bacteria. She is also encouraged to maintain adequate hydration, practice front-to-back wiping, and urinate post-coitus to prevent future UTIs. A vaginal pathogen swab will be conducted to rule out bacterial vaginosis and yeast infection, as an imbalance in beulah can lead to recurrent UTIs due to compromised infection-fighting ability. A 7-day course of cefdinir, a cephalosporin antibiotic, will be initiated. A urine culture will also be ordered. If the culture results are negative, she may discontinue the antibiotic. However, if she experiences significant symptom relief, she should complete the full 7-day course. Any positive findings from the vaginal pathogen swab will be addressed and treated accordingly.      Shared decision-making was utilized with patient for treatment plan. Medication discussed included indication for use and the potential benefits and side effects. Education was provided regarding the aforementioned assessments.  Differential Diagnosis, natural history, and supportive care discussed. All of the patient's questions were answered to their satisfaction at the time of discharge. Patient was encouraged to monitor symptoms closely. Those signs and symptoms which would warrant concern and mandate seeking a higher level of service through the emergency department discussed at length.  Patient stated agreement and understanding of this plan of care.    Disposition:  Home in stable condition     Voice Recognition Disclaimer:  Portions of this document were created using voice recognition software and [employer] technology provided by [employer].  Patient was informed of [email or web address] technology being used.  The software does have a chance of  producing errors of grammar and possibly content. I have made every reasonable attempt to correct obvious errors, but there could be errors of grammar and possibly content that I did not discover before finalizing the documentation.    CRISTINA Morales.

## 2025-03-11 ENCOUNTER — RESULTS FOLLOW-UP (OUTPATIENT)
Dept: URGENT CARE | Facility: PHYSICIAN GROUP | Age: 30
End: 2025-03-11
Payer: COMMERCIAL

## 2025-03-11 DIAGNOSIS — N76.0 BACTERIAL VAGINITIS: ICD-10-CM

## 2025-03-11 DIAGNOSIS — B96.89 BACTERIAL VAGINITIS: ICD-10-CM

## 2025-03-11 LAB
CANDIDA DNA VAG QL PROBE+SIG AMP: NEGATIVE
G VAGINALIS DNA VAG QL PROBE+SIG AMP: POSITIVE
T VAGINALIS DNA VAG QL PROBE+SIG AMP: NEGATIVE

## 2025-03-11 RX ORDER — METRONIDAZOLE 500 MG/1
500 TABLET ORAL 2 TIMES DAILY
Qty: 14 TABLET | Refills: 0 | Status: SHIPPED | OUTPATIENT
Start: 2025-03-11 | End: 2025-03-18

## 2025-03-12 ENCOUNTER — APPOINTMENT (OUTPATIENT)
Dept: MEDICAL GROUP | Facility: LAB | Age: 30
End: 2025-03-12
Payer: COMMERCIAL

## 2025-03-13 LAB
BACTERIA UR CULT: NORMAL
SIGNIFICANT IND 70042: NORMAL
SITE SITE: NORMAL
SOURCE SOURCE: NORMAL

## 2025-03-27 ENCOUNTER — APPOINTMENT (OUTPATIENT)
Dept: MEDICAL GROUP | Facility: LAB | Age: 30
End: 2025-03-27
Payer: COMMERCIAL

## 2025-05-20 ENCOUNTER — EMPLOYEE HEALTH (OUTPATIENT)
Dept: OCCUPATIONAL MEDICINE | Facility: CLINIC | Age: 30
End: 2025-05-20

## 2025-05-20 ENCOUNTER — HOSPITAL ENCOUNTER (OUTPATIENT)
Facility: MEDICAL CENTER | Age: 30
End: 2025-05-20
Attending: PREVENTIVE MEDICINE
Payer: COMMERCIAL

## 2025-05-20 ENCOUNTER — EH NON-PROVIDER (OUTPATIENT)
Dept: OCCUPATIONAL MEDICINE | Facility: CLINIC | Age: 30
End: 2025-05-20

## 2025-05-20 DIAGNOSIS — Z02.89 ENCOUNTER FOR OCCUPATIONAL HEALTH EXAMINATION: ICD-10-CM

## 2025-05-20 DIAGNOSIS — Z02.1 PHYSICAL EXAM, PRE-EMPLOYMENT: Primary | ICD-10-CM

## 2025-05-20 DIAGNOSIS — Z02.89 ENCOUNTER FOR OCCUPATIONAL HEALTH EXAMINATION: Primary | ICD-10-CM

## 2025-05-20 LAB
AMP AMPHETAMINE: NORMAL
BAR BARBITURATES: NORMAL
BZO BENZODIAZEPINES: NORMAL
COC COCAINE: NORMAL
INT CON NEG: NORMAL
INT CON POS: NORMAL
MDMA ECSTASY: NORMAL
MET METHAMPHETAMINES: NORMAL
MTD METHADONE: NORMAL
OPI OPIATES: NORMAL
OXY OXYCODONE: NORMAL
PCP PHENCYCLIDINE: NORMAL
POC URINE DRUG SCREEN OCDRS: NORMAL
THC: NORMAL

## 2025-05-20 PROCEDURE — 94375 RESPIRATORY FLOW VOLUME LOOP: CPT | Performed by: PREVENTIVE MEDICINE

## 2025-05-20 PROCEDURE — 8915 PR COMPREHENSIVE PHYSICAL: Performed by: PREVENTIVE MEDICINE

## 2025-05-20 PROCEDURE — 86480 TB TEST CELL IMMUN MEASURE: CPT | Performed by: PREVENTIVE MEDICINE

## 2025-05-20 PROCEDURE — 80305 DRUG TEST PRSMV DIR OPT OBS: CPT | Performed by: PREVENTIVE MEDICINE

## 2025-05-22 LAB
GAMMA INTERFERON BACKGROUND BLD IA-ACNC: 0.02 IU/ML
M TB IFN-G BLD-IMP: NEGATIVE
M TB IFN-G CD4+ BCKGRND COR BLD-ACNC: 0 IU/ML
MITOGEN IGNF BCKGRD COR BLD-ACNC: 9.59 IU/ML
QFT TB2 - NIL TBQ2: 0 IU/ML